# Patient Record
Sex: FEMALE | Race: WHITE | NOT HISPANIC OR LATINO | Employment: FULL TIME | URBAN - METROPOLITAN AREA
[De-identification: names, ages, dates, MRNs, and addresses within clinical notes are randomized per-mention and may not be internally consistent; named-entity substitution may affect disease eponyms.]

---

## 2017-01-26 ENCOUNTER — CONVERSION ENCOUNTER (OUTPATIENT)
Dept: GENERAL RADIOLOGY | Facility: HOSPITAL | Age: 52
End: 2017-01-26

## 2018-03-09 ENCOUNTER — OFFICE VISIT CONVERTED (OUTPATIENT)
Dept: FAMILY MEDICINE CLINIC | Facility: CLINIC | Age: 53
End: 2018-03-09
Attending: NURSE PRACTITIONER

## 2018-03-09 ENCOUNTER — CONVERSION ENCOUNTER (OUTPATIENT)
Dept: FAMILY MEDICINE CLINIC | Facility: CLINIC | Age: 53
End: 2018-03-09

## 2020-01-21 ENCOUNTER — OFFICE VISIT CONVERTED (OUTPATIENT)
Dept: FAMILY MEDICINE CLINIC | Facility: CLINIC | Age: 55
End: 2020-01-21
Attending: NURSE PRACTITIONER

## 2020-01-21 ENCOUNTER — HOSPITAL ENCOUNTER (OUTPATIENT)
Dept: LAB | Facility: HOSPITAL | Age: 55
Discharge: HOME | End: 2020-01-21
Attending: NURSE PRACTITIONER

## 2020-01-21 LAB
25(OH)D3 SERPL-MCNC: 26.3 NG/ML (ref 30–100)
ALBUMIN SERPL-MCNC: 4.3 G/DL (ref 3.5–5)
ALBUMIN/GLOB SERPL: 1.3 {RATIO} (ref 1.4–2.6)
ALP SERPL-CCNC: 64 U/L (ref 53–141)
ALT SERPL-CCNC: 10 U/L (ref 10–40)
ANION GAP SERPL CALC-SCNC: 17 MMOL/L (ref 8–19)
APPEARANCE UR: CLEAR
AST SERPL-CCNC: 11 U/L (ref 15–50)
BILIRUB SERPL-MCNC: 0.41 MG/DL (ref 0.2–1.3)
BILIRUB UR QL: NEGATIVE
BUN SERPL-MCNC: 11 MG/DL (ref 5–25)
BUN/CREAT SERPL: 16 {RATIO} (ref 6–20)
CALCIUM SERPL-MCNC: 9.2 MG/DL (ref 8.7–10.4)
CHLORIDE SERPL-SCNC: 99 MMOL/L (ref 99–111)
CHOLEST SERPL-MCNC: 228 MG/DL (ref 107–200)
CHOLEST/HDLC SERPL: 5.6 {RATIO} (ref 3–6)
COLOR UR: YELLOW
CONV CO2: 26 MMOL/L (ref 22–32)
CONV COLLECTION SOURCE (UA): NORMAL
CONV CREATININE URINE, RANDOM: 37.9 MG/DL (ref 10–300)
CONV MICROALBUM.,U,RANDOM: <12 MG/L (ref 0–20)
CONV TOTAL PROTEIN: 7.6 G/DL (ref 6.3–8.2)
CONV UROBILINOGEN IN URINE BY AUTOMATED TEST STRIP: 0.2 {EHRLICHU}/DL (ref 0.1–1)
CREAT UR-MCNC: 0.69 MG/DL (ref 0.5–0.9)
EST. AVERAGE GLUCOSE BLD GHB EST-MCNC: 111 MG/DL
GFR SERPLBLD BASED ON 1.73 SQ M-ARVRAT: >60 ML/MIN/{1.73_M2}
GLOBULIN UR ELPH-MCNC: 3.3 G/DL (ref 2–3.5)
GLUCOSE SERPL-MCNC: 105 MG/DL (ref 65–99)
GLUCOSE UR QL: NEGATIVE MG/DL
HBA1C MFR BLD: 5.5 % (ref 3.5–5.7)
HDLC SERPL-MCNC: 41 MG/DL (ref 40–60)
HGB UR QL STRIP: NEGATIVE
KETONES UR QL STRIP: NEGATIVE MG/DL
LDLC SERPL CALC-MCNC: 160 MG/DL (ref 70–100)
LEUKOCYTE ESTERASE UR QL STRIP: NEGATIVE
MICROALBUMIN/CREAT UR: 31.7 MG/G{CRE} (ref 0–35)
NITRITE UR QL STRIP: NEGATIVE
OSMOLALITY SERPL CALC.SUM OF ELEC: 286 MOSM/KG (ref 273–304)
PH UR STRIP.AUTO: 6 [PH] (ref 5–8)
POTASSIUM SERPL-SCNC: 4.1 MMOL/L (ref 3.5–5.3)
PROT UR QL: NEGATIVE MG/DL
SODIUM SERPL-SCNC: 138 MMOL/L (ref 135–147)
SP GR UR: 1.01 (ref 1–1.03)
T4 FREE SERPL-MCNC: 1.1 NG/DL (ref 0.9–1.8)
TRIGL SERPL-MCNC: 135 MG/DL (ref 40–150)
TSH SERPL-ACNC: 2.56 M[IU]/L (ref 0.27–4.2)
VLDLC SERPL-MCNC: 27 MG/DL (ref 5–37)

## 2020-02-12 ENCOUNTER — HOSPITAL ENCOUNTER (OUTPATIENT)
Dept: GENERAL RADIOLOGY | Facility: HOSPITAL | Age: 55
Discharge: HOME | End: 2020-02-12
Attending: NURSE PRACTITIONER

## 2020-02-21 ENCOUNTER — OFFICE VISIT CONVERTED (OUTPATIENT)
Dept: FAMILY MEDICINE CLINIC | Facility: CLINIC | Age: 55
End: 2020-02-21
Attending: NURSE PRACTITIONER

## 2020-06-09 ENCOUNTER — CONVERSION ENCOUNTER (OUTPATIENT)
Dept: FAMILY MEDICINE CLINIC | Facility: CLINIC | Age: 55
End: 2020-06-09

## 2020-06-09 ENCOUNTER — OFFICE VISIT CONVERTED (OUTPATIENT)
Dept: FAMILY MEDICINE CLINIC | Facility: CLINIC | Age: 55
End: 2020-06-09
Attending: NURSE PRACTITIONER

## 2020-06-09 ENCOUNTER — HOSPITAL ENCOUNTER (OUTPATIENT)
Dept: LAB | Facility: HOSPITAL | Age: 55
Discharge: HOME | End: 2020-06-09
Attending: NURSE PRACTITIONER

## 2020-06-09 LAB
25(OH)D3 SERPL-MCNC: 35.5 NG/ML (ref 30–100)
ALBUMIN SERPL-MCNC: 4.3 G/DL (ref 3.5–5)
ALBUMIN/GLOB SERPL: 1.3 {RATIO} (ref 1.4–2.6)
ALP SERPL-CCNC: 71 U/L (ref 53–141)
ALT SERPL-CCNC: 11 U/L (ref 10–40)
ANION GAP SERPL CALC-SCNC: 18 MMOL/L (ref 8–19)
APPEARANCE UR: CLEAR
AST SERPL-CCNC: 12 U/L (ref 15–50)
BILIRUB SERPL-MCNC: 0.33 MG/DL (ref 0.2–1.3)
BILIRUB UR QL: NEGATIVE
BUN SERPL-MCNC: 14 MG/DL (ref 5–25)
BUN/CREAT SERPL: 17 {RATIO} (ref 6–20)
CALCIUM SERPL-MCNC: 9.3 MG/DL (ref 8.7–10.4)
CHLORIDE SERPL-SCNC: 100 MMOL/L (ref 99–111)
CHOLEST SERPL-MCNC: 206 MG/DL (ref 107–200)
CHOLEST/HDLC SERPL: 4.8 {RATIO} (ref 3–6)
COLOR UR: YELLOW
CONV BACTERIA: ABNORMAL
CONV CO2: 23 MMOL/L (ref 22–32)
CONV COLLECTION SOURCE (UA): ABNORMAL
CONV CREATININE URINE, RANDOM: 38 MG/DL (ref 10–300)
CONV MICROALBUM.,U,RANDOM: <12 MG/L (ref 0–20)
CONV TOTAL PROTEIN: 7.5 G/DL (ref 6.3–8.2)
CONV UROBILINOGEN IN URINE BY AUTOMATED TEST STRIP: 0.2 {EHRLICHU}/DL (ref 0.1–1)
CREAT UR-MCNC: 0.81 MG/DL (ref 0.5–0.9)
EST. AVERAGE GLUCOSE BLD GHB EST-MCNC: 117 MG/DL
GFR SERPLBLD BASED ON 1.73 SQ M-ARVRAT: >60 ML/MIN/{1.73_M2}
GLOBULIN UR ELPH-MCNC: 3.2 G/DL (ref 2–3.5)
GLUCOSE SERPL-MCNC: 103 MG/DL (ref 65–99)
GLUCOSE UR QL: NEGATIVE MG/DL
HBA1C MFR BLD: 5.7 % (ref 3.5–5.7)
HDLC SERPL-MCNC: 43 MG/DL (ref 40–60)
HGB UR QL STRIP: NEGATIVE
KETONES UR QL STRIP: NEGATIVE MG/DL
LDLC SERPL CALC-MCNC: 135 MG/DL (ref 70–100)
LEUKOCYTE ESTERASE UR QL STRIP: ABNORMAL
MICROALBUMIN/CREAT UR: 31.6 MG/G{CRE} (ref 0–35)
NITRITE UR QL STRIP: NEGATIVE
OSMOLALITY SERPL CALC.SUM OF ELEC: 285 MOSM/KG (ref 273–304)
PH UR STRIP.AUTO: 7.5 [PH] (ref 5–8)
POTASSIUM SERPL-SCNC: 4 MMOL/L (ref 3.5–5.3)
PROT UR QL: NEGATIVE MG/DL
RBC #/AREA URNS HPF: ABNORMAL /[HPF]
SODIUM SERPL-SCNC: 137 MMOL/L (ref 135–147)
SP GR UR: 1.01 (ref 1–1.03)
SQUAMOUS SPT QL MICRO: ABNORMAL /[HPF]
T4 FREE SERPL-MCNC: 1 NG/DL (ref 0.9–1.8)
TRIGL SERPL-MCNC: 138 MG/DL (ref 40–150)
TSH SERPL-ACNC: 2.05 M[IU]/L (ref 0.27–4.2)
VLDLC SERPL-MCNC: 28 MG/DL (ref 5–37)
WBC #/AREA URNS HPF: ABNORMAL /[HPF]

## 2020-07-10 ENCOUNTER — CONVERSION ENCOUNTER (OUTPATIENT)
Dept: FAMILY MEDICINE CLINIC | Facility: CLINIC | Age: 55
End: 2020-07-10

## 2020-07-10 ENCOUNTER — OFFICE VISIT CONVERTED (OUTPATIENT)
Dept: FAMILY MEDICINE CLINIC | Facility: CLINIC | Age: 55
End: 2020-07-10
Attending: NURSE PRACTITIONER

## 2020-09-30 ENCOUNTER — CONVERSION ENCOUNTER (OUTPATIENT)
Dept: FAMILY MEDICINE CLINIC | Facility: CLINIC | Age: 55
End: 2020-09-30

## 2020-09-30 ENCOUNTER — HOSPITAL ENCOUNTER (OUTPATIENT)
Dept: GENERAL RADIOLOGY | Facility: HOSPITAL | Age: 55
Discharge: HOME | End: 2020-09-30
Attending: NURSE PRACTITIONER

## 2020-09-30 ENCOUNTER — OFFICE VISIT CONVERTED (OUTPATIENT)
Dept: FAMILY MEDICINE CLINIC | Facility: CLINIC | Age: 55
End: 2020-09-30
Attending: NURSE PRACTITIONER

## 2020-11-10 ENCOUNTER — HOSPITAL ENCOUNTER (OUTPATIENT)
Dept: LAB | Facility: HOSPITAL | Age: 55
Discharge: HOME | End: 2020-11-10

## 2020-11-10 LAB
25(OH)D3 SERPL-MCNC: 42 NG/ML (ref 30–100)
ALBUMIN SERPL-MCNC: 4.3 G/DL (ref 3.5–5)
ALBUMIN/GLOB SERPL: 1.4 {RATIO} (ref 1.4–2.6)
ALP SERPL-CCNC: 64 U/L (ref 53–141)
ALT SERPL-CCNC: 23 U/L (ref 10–40)
ANION GAP SERPL CALC-SCNC: 16 MMOL/L (ref 8–19)
AST SERPL-CCNC: 16 U/L (ref 15–50)
BASOPHILS # BLD AUTO: 0.05 10*3/UL (ref 0–0.2)
BASOPHILS NFR BLD AUTO: 0.7 % (ref 0–3)
BILIRUB SERPL-MCNC: 0.39 MG/DL (ref 0.2–1.3)
BUN SERPL-MCNC: 13 MG/DL (ref 5–25)
BUN/CREAT SERPL: 18 {RATIO} (ref 6–20)
CALCIUM SERPL-MCNC: 9.7 MG/DL (ref 8.7–10.4)
CHLORIDE SERPL-SCNC: 102 MMOL/L (ref 99–111)
CHOLEST SERPL-MCNC: 215 MG/DL (ref 107–200)
CHOLEST/HDLC SERPL: 5.7 {RATIO} (ref 3–6)
CONV ABS IMM GRAN: 0.02 10*3/UL (ref 0–0.2)
CONV CO2: 24 MMOL/L (ref 22–32)
CONV IMMATURE GRAN: 0.3 % (ref 0–1.8)
CONV TOTAL PROTEIN: 7.4 G/DL (ref 6.3–8.2)
CREAT UR-MCNC: 0.72 MG/DL (ref 0.5–0.9)
DEPRECATED RDW RBC AUTO: 38.5 FL (ref 36.4–46.3)
EOSINOPHIL # BLD AUTO: 0.13 10*3/UL (ref 0–0.7)
EOSINOPHIL # BLD AUTO: 1.9 % (ref 0–7)
ERYTHROCYTE [DISTWIDTH] IN BLOOD BY AUTOMATED COUNT: 12.7 % (ref 11.7–14.4)
EST. AVERAGE GLUCOSE BLD GHB EST-MCNC: 137 MG/DL
GFR SERPLBLD BASED ON 1.73 SQ M-ARVRAT: >60 ML/MIN/{1.73_M2}
GLOBULIN UR ELPH-MCNC: 3.1 G/DL (ref 2–3.5)
GLUCOSE SERPL-MCNC: 121 MG/DL (ref 65–99)
HBA1C MFR BLD: 6.4 % (ref 3.5–5.7)
HCT VFR BLD AUTO: 39.4 % (ref 37–47)
HDLC SERPL-MCNC: 38 MG/DL (ref 40–60)
HGB BLD-MCNC: 13.2 G/DL (ref 12–16)
LDLC SERPL CALC-MCNC: 149 MG/DL (ref 70–100)
LH SERPL-ACNC: 40.2 M[IU]/ML
LYMPHOCYTES # BLD AUTO: 1.84 10*3/UL (ref 1–5)
LYMPHOCYTES NFR BLD AUTO: 27.5 % (ref 20–45)
MCH RBC QN AUTO: 28.4 PG (ref 27–31)
MCHC RBC AUTO-ENTMCNC: 33.5 G/DL (ref 33–37)
MCV RBC AUTO: 84.7 FL (ref 81–99)
MONOCYTES # BLD AUTO: 0.57 10*3/UL (ref 0.2–1.2)
MONOCYTES NFR BLD AUTO: 8.5 % (ref 3–10)
NEUTROPHILS # BLD AUTO: 4.07 10*3/UL (ref 2–8)
NEUTROPHILS NFR BLD AUTO: 61.1 % (ref 30–85)
NRBC CBCN: 0 % (ref 0–0.7)
OSMOLALITY SERPL CALC.SUM OF ELEC: 287 MOSM/KG (ref 273–304)
PLATELET # BLD AUTO: 295 10*3/UL (ref 130–400)
PMV BLD AUTO: 9.8 FL (ref 9.4–12.3)
POTASSIUM SERPL-SCNC: 3.7 MMOL/L (ref 3.5–5.3)
RBC # BLD AUTO: 4.65 10*6/UL (ref 4.2–5.4)
SODIUM SERPL-SCNC: 138 MMOL/L (ref 135–147)
T4 FREE SERPL-MCNC: 0.9 NG/DL (ref 0.9–1.8)
TRIGL SERPL-MCNC: 138 MG/DL (ref 40–150)
TSH SERPL-ACNC: 1.59 M[IU]/L (ref 0.27–4.2)
VLDLC SERPL-MCNC: 28 MG/DL (ref 5–37)
WBC # BLD AUTO: 6.68 10*3/UL (ref 4.8–10.8)

## 2020-11-11 LAB
INSULIN SERPL-ACNC: 11.8 UIU/ML (ref 2.6–24.9)
PROGEST SERPL-MCNC: 0.2 NG/ML

## 2020-11-15 LAB — CONV ESTROGENS, TOTAL, SERUM: 207 PG/ML

## 2020-11-16 LAB
CONV TESTOSTERONE, FREE: 1.7 PG/ML
TESTOST FREE MFR SERPL: 1.1 %
TESTOST SERPL-MCNC: 15 NG/DL

## 2020-11-17 LAB — T3REVERSE SERPL-MCNC: 18.1 NG/DL (ref 9.2–24.1)

## 2021-05-13 NOTE — PROGRESS NOTES
Progress Note      Patient Name: Zahira Ramirez   Patient ID: [id number]   Sex: Female   YOB: 1965    Referring Provider: Radha JEAN BAPTISTE    Visit Date: September 30, 2020    Provider: PEYTON Cruz   Location: [facility]   Location Address: [address] [place] [address], [address] [place] [address]  [id number]   Location Phone: [phone]          Chief Complaint     The patient is here for left shoulder pain.       History Of Present Illness  Zahira Ramirez is a 54 year old /White female who presents for evaluation and treatment of:      patient presents to the clinic today with acute complaints of left shoulder pain. She states this started 2 weeks ago with no recall of injury. She has been taking Tylenol, advil, aleve with no relief any more. She did state that the pain starts in her shoulder and goes to her neck and down her shoulder. She states that it is constant pain. She also reports numbness/tingling in her fingers when her arm is in a certain position.Pain is more suprascapular as well as around her scapula paraspinous muscles.       Past Medical History  Disease Name Date Onset Notes   Anemia --  --    Broken Bones --  --    Diabetes --  --    Endometriosis --  --    Forgetfulness --  --    Hyperlipemia --  --    Hypothyroidism --  --    Stroke --  --          Past Surgical History  Procedure Name Date Notes   Ablation --  --    carpal tunnel --  --    Cesarian Section --  --    Hysterectomy-Abdominal --  --    Laparotomy --  --    Tubal ligation --  --          Medication List  Name Date Started Instructions   Accu-Chek Fastclix Lancet Drum miscellaneous misc 09/07/2020 USE TO TEST ONCE DAILY   Accu-Chek Guide miscellaneous strip 09/07/2020 USE TO TEST ONCE DAILY   [place] Thyroid 60 mg oral tablet 08/21/2020 TAKE 1 TABLET BY MOUTH EVERY DAY   Crestor 5 mg oral tablet 01/22/2020 take 1 tablet (5 mg) by oral route once daily at bedtime for 90 days    ergocalciferol (vitamin D2) 1,250 mcg (50,000 unit) oral capsule 09/07/2020 TAKE 1 CAPSULE BY MOUTH 1 TIME WEEKLY   glucometer with lancets and strips 06/09/2020 E11.9 testing once a day   lisinopril 2.5 mg oral tablet 02/21/2020 take 1 tablet (2.5 mg) by oral route once daily for 30 days   multivitamin oral tablet  take 1 tablet by oral route daily   phentermine 37.5 mg oral tablet 07/13/2020 take 1 tablet (37.5 mg) by oral route once daily before breakfast for 30 days   Vitamin D2 50,000 unit oral capsule 01/22/2020 take 1 capsule (50,000 unit) by oral route once weekly         Allergy List  Allergen Name Date Reaction Notes   NO KNOWN DRUG ALLERGIES --  --  --        Allergies Reconciled  Family Medical History  Disease Name Relative/Age Notes   Cervical Neoplasm, Malignant Mother/   --    Stroke  --    Heart Disease  --    Aneurysm Mother/   --    Uterine Cancer, Family History  --          Social History  Finding Status Start/Stop Quantity Notes   Alcohol Current some day 0/0 --  drinks rarely; wine   Caffeine Current every day 0/0 --  drinks regularly; coffee; 1-2 times per day   Second hand smoke exposure Never 0/0 --  no   Tobacco Former 13/40 --  former smoker; started smoking at age 15; quit smoking at age 42; smoked 20 cigarette(s) per day         Immunizations  NameDate Admin Mfg Trade Name Lot Number Route Inj VIS Given VIS Publication   Vpycgapva09/30/2020 [facility] Fluzone Quadrivalent [id number] IM RD 09/30/2020 08/15/2019   Comments: pt tolerated injection well         Review of Systems  · Constitutional  o Denies  o : fever, fatigue, weight loss, weight gain  · Cardiovascular  o Denies  o : lower extremity edema, claudication, chest pressure, palpitations  · Respiratory  o Denies  o : shortness of breath, wheezing, cough, hemoptysis, dyspnea on exertion  · Gastrointestinal  o Denies  o : nausea, vomiting, diarrhea, constipation, abdominal pain  · Musculoskeletal  o Admits  o : joint pain, shoulder  "pain      Vitals  Date Time BP Position Site L\R Cuff Size HR RR TEMP (F) WT  HT  BMI kg/m2 BSA m2 O2 Sat FR L/min FiO2 HC       09/30/2020 10:02 /78 Sitting    77 - R 16 97.9  5'  3\"   99 %            Physical Examination  · Constitutional  o Appearance  o : well-nourished, well developed, alert, in no acute distress  · Neck  o Inspection/Palpation  o : normal appearance, no masses or tenderness, trachea midline, no enlarged cervical or supraclavicular lymphnodes palpated  · Respiratory  o Respiratory Effort  o : breathing unlabored  o Inspection of Chest  o : normal appearance, no retractions  o Auscultation of Lungs  o : normal breath sounds throughout  · Cardiovascular  o Heart  o :   § Auscultation of Heart  § : regular rate and rhythm without murmur, PMI normal  o Peripheral Vascular System  o :   § Extremities  § : no cyanosis, clubbing or edema; less than 2 second refill noted  · Musculoskeletal  o General  o : Patient does have left shoulder pain. She is able to perform ROM, but is in a lot of pain when doing so. Tenderness to palpation in the left shoulder suprascapular subscapular as well as paraspinous.  · Skin and Subcutaneous Tissue  o General Inspection  o : no rashes or lesions present, no areas of discoloration  · Neurologic  o Mental Status Examination  o : judgement, insight intact, modd and affect appropriate  o Motor Examination  o : strength grossly intact in all four extremities  o Gait and Station  o : normal gait, able to stand without difficulty          Assessment  · Need for influenza vaccination     V04.81/Z23  · Screening for colon cancer     V76.51/Z12.11  · Cervical pain (neck)     723.1/M54.2  · Polyarthralgia     719.49/M25.50  · Shoulder pain     719.41/M25.519      Plan  · Orders  o Immunization Admin Fee (Single) ([facility]) (45496) - V04.81/Z23 - 09/30/2020  o Fluzone Quadrivalent Vaccine, age 6 months + (44914) - V04.81/Z23 - 09/30/2020   Vaccine - Influenza; Dose: 0.5; Site: " Right Deltoid; Route: Intramuscular; Date: 09/30/2020 10:53:00; Exp: 06/30/2021; Lot: [id number]; Mfg: sanofi pasteur; TradeName: Fluzone Quadrivalent; Administered By: Jacky Bellamy MA; Comment: pt tolerated injection well  o ACO-39: Current medications updated and reviewed (1159F, ) - - 09/30/2020  o ACO-14: Influenza immunization administered or previously received [facility] () - - 09/30/2020  o PHYSICAL THERAPY CONSULTATION ([facility]) - 723.1/M54.2, 719.49/M25.50, 719.41/M25.519 - 09/30/2020  o Shoulder (Left) 2 or more views X-Ray [facility] Preferred View ([id number]-TJ) - 719.41/M25.519 - 09/30/2020  · Medications  o cyclobenzaprine 10 mg oral tablet   SIG: take 1 tablet (10 mg) by oral route 2 times per day as needed   DISP: (30) Tablet with 0 refills  Prescribed on 09/30/2020     o diclofenac sodium 75 mg oral tablet,delayed release (DR/EC)   SIG: take 1 tablet (75 mg) by oral route 2 times per day for 30 days   DISP: (60) Tablet with 0 refills  Prescribed on 09/30/2020     o Medications have been Reconciled  o Transition of Care or Provider Policy  · Instructions  o Patient declines colorectal cancer screening. Discussed risks associated with not having screening performed.   o Patient was educated/instructed on their diagnosis, treatment and medications prior to discharge from the clinic today.  o Minutes spent with patient including greater than 50% in Education/Counseling/Care Coordination.  o Time spent with the patient was minutes, more than 50% face to face.  · Disposition  o Call or Return if symptoms worsen or persist.            Electronically Signed by: PEYTON Cruz - on September 30, 2020 11:25:10 AM

## 2021-05-13 NOTE — PROGRESS NOTES
Progress Note      Patient Name: Zahira Ramirez   Patient ID: [id number]   Sex: Female   YOB: 1965    Referring Provider: Radha JEAN BAPTISTE    Visit Date: July 10, 2020    Provider: PEYTON Cruz   Location: [facility]   Location Address: [address] [place] [address], [address]  [id number]   Location Phone: [phone]          Chief Complaint     The patient is here for a one month f/u of weight.       History Of Present Illness  Zahira Ramirez is a 54 year old /White female who presents for evaluation and treatment of:      Obesity:  doing well lost 9 pounds in 1 month.  And denies chest pain and SOB.  Doing well and is feeling good on medication.       Past Medical History  Disease Name Date Onset Notes   Anemia --  --    Broken Bones --  --    Diabetes --  --    Endometriosis --  --    Forgetfulness --  --    Hyperlipemia --  --    Hypothyroidism --  --    Stroke --  --          Past Surgical History  Procedure Name Date Notes   Ablation --  --    carpal tunnel --  --    Cesarian Section --  --    Hysterectomy-Abdominal --  --    Laparotomy --  --    Tubal ligation --  --          Medication List  Name Date Started Instructions   [place] Thyroid 60 mg oral tablet 02/21/2020 TAKE 1 TABLET BY MOUTH EVERY DAY   Crestor 5 mg oral tablet 01/22/2020 take 1 tablet (5 mg) by oral route once daily at bedtime for 90 days   glucometer with lancets and strips 06/09/2020 E11.9 testing once a day   lisinopril 2.5 mg oral tablet 02/21/2020 take 1 tablet (2.5 mg) by oral route once daily for 30 days   multivitamin oral tablet  take 1 tablet by oral route daily   phentermine 37.5 mg oral tablet 06/09/2020 take 1 tablet (37.5 mg) by oral route once daily before breakfast for 30 days   Vitamin D2 50,000 unit oral capsule 01/22/2020 take 1 capsule (50,000 unit) by oral route once weekly         Allergy List  Allergen Name Date Reaction Notes   NO KNOWN DRUG ALLERGIES --  --   "--          Family Medical History  Disease Name Relative/Age Notes   Cervical Neoplasm, Malignant Mother/   --    Stroke  --    Heart Disease  --    Aneurysm Mother/   --    Uterine Cancer, Family History  --          Social History  Finding Status Start/Stop Quantity Notes   Alcohol Current some day 0/0 --  drinks rarely; wine   Caffeine Current every day 0/0 --  drinks regularly; coffee; 1-2 times per day   Second hand smoke exposure Never 0/0 --  no   Tobacco Former 13/40 --  former smoker; started smoking at age 15; quit smoking at age 42; smoked 20 cigarette(s) per day         Immunizations  NameDate Admin Mfg Trade Name Lot Number Route Inj VIS Given VIS Publication   Vpwqutkyh53/21/2020 [facility] Fluzone Quadrivalent [id number] [facility] 01/21/2020    Comments: pt tolerated injection well   Wrmvmcdiy97/17/2017 [facility] Fluarix, quadrivalent, preservative free [id number] [facility] 10/17/2017 08/07/2015   Comments: pt left office in stable condition   Zufzbjdqc15/12/2016 [facility] Fluarix, quadrivalent, preservative free [id number] [facility] 10/12/2016 07/02/2012   Comments: Pt tolerated inj well         Review of Systems  · Constitutional  o Admits  o : weight loss  o Denies  o : fever, fatigue, weight gain  · Cardiovascular  o Denies  o : lower extremity edema, claudication, chest pressure, palpitations  · Respiratory  o Denies  o : shortness of breath, wheezing, cough, hemoptysis, dyspnea on exertion  · Gastrointestinal  o Denies  o : nausea, vomiting, diarrhea, constipation, abdominal pain      Vitals  Date Time BP Position Site L\R Cuff Size HR RR TEMP (F) WT  HT  BMI kg/m2 BSA m2 O2 Sat        07/10/2020 09:03 /84 Sitting    89 - R 16 98 218lbs 0oz 5'  3\" 38.62 2.1 100 %          Physical Examination  · Constitutional  o Appearance  o : well-nourished, well developed, alert, in no acute distress  · Eyes  o Conjunctivae  o : conjunctivae normal  o Sclerae  o : sclerae white  o Pupils and Irises  o : pupils equal, round, and reactive to " light and accommodation bilaterally  o Corneas  o : tear film normal, no lesions present  o Eyelids/Ocular Adnexae  o : eyelid appearance normal, no exudates present, eye moisture level normal  · Respiratory  o Respiratory Effort  o : breathing unlabored  o Inspection of Chest  o : normal appearance, no retractions  o Auscultation of Lungs  o : normal breath sounds throughout  · Cardiovascular  o Heart  o :   § Auscultation of Heart  § : regular rate and rhythm without murmur, PMI normal  o Peripheral Vascular System  o :   § Extremities  § : no cyanosis, clubbing or edema; less than 2 second refill noted  · Musculoskeletal  o General  o : No joint swelling or deformity noted. Muscle tone, strength and development grossly normal.  · Skin and Subcutaneous Tissue  o General Inspection  o : no rashes or lesions present, no areas of discoloration  · Neurologic  o Mental Status Examination  o : judgement, insight intact, modd and affect appropriate  o Motor Examination  o : strength grossly intact in all four extremities  o Gait and Station  o : normal gait, able to stand without difficulty          Assessment  · Obesity     278.00/E66.9  · BMI 38.0-38.9,adult     V85.38/Z68.38      Plan  · Orders  o ACO-39: Current medications updated and reviewed () - - 07/10/2020  o ACO-14: Influenza immunization administered or previously received () - - 07/10/2020  · Medications  o phentermine 37.5 mg oral tablet   SIG: take 1 tablet (37.5 mg) by oral route once daily before breakfast for 30 days   DISP: (30) tablets with 0 refills  Refilled on 07/10/2020     o Medications have been Reconciled  o Transition of Care or Provider Policy  · Instructions  o Patient was educated/instructed on their diagnosis, treatment and medications prior to discharge from the clinic today.  o Minutes spent with patient including greater than 50% in Education/Counseling/Care Coordination.  o Time spent with the patient was minutes, more than  50% face to face.  · Disposition  o F/U in 1 month            Electronically Signed by: PEYTON Cruz -Author on July 10, 2020 09:20:38 AM

## 2021-05-13 NOTE — PROGRESS NOTES
Progress Note      Patient Name: Zahira Ramirez   Patient ID: [id number]   Sex: Female   YOB: 1965    Referring Provider: Radha JEAN BAPTISTE    Visit Date: June 9, 2020    Provider: PEYTON Cruz   [facility]   Location Address: [address] [place] [address], [address]  [id number]   Location Phone: [phone]          Chief Complaint     The patient is here for a f/u of weight.       History Of Present Illness  Zahira Ramirez is a 54 year old /White female who presents for evaluation and treatment of:      follow up on weight.  Hasn't had med for 3 months.  Didn't have isssues with when taking.  Has gained 10 pounds in the last months during quarantined.    Hypo-thyroidism.  She needs to get her labs checked.    Diabetes: Doing well with her diet controlled.  However she does note that her wounds are taking longer to heal.  So she knows she does need to lose weight.  Also states that she is a glucometer so she can check her sugars.  She has not been taking her Crestor due to the fact that it causes her hair to fall out.       Past Medical History  Disease Name Date Onset Notes   Anemia --  --    Broken Bones --  --    Diabetes --  --    Endometriosis --  --    Forgetfulness --  --    Hyperlipemia --  --    Hypothyroidism --  --    Stroke --  --          Past Surgical History  Procedure Name Date Notes   Ablation --  --    carpal tunnel --  --    Cesarian Section --  --    Hysterectomy-Abdominal --  --    Laparotomy --  --    Tubal ligation --  --          Medication List  Name Date Started Instructions   [place] Thyroid 60 mg oral tablet 02/21/2020 TAKE 1 TABLET BY MOUTH EVERY DAY   Crestor 5 mg oral tablet 01/22/2020 take 1 tablet (5 mg) by oral route once daily at bedtime for 90 days   lisinopril 2.5 mg oral tablet 02/21/2020 take 1 tablet (2.5 mg) by oral route once daily for 30 days   multivitamin oral tablet  take 1 tablet by oral route daily    phentermine 37.5 mg oral tablet 02/21/2020 take 1 tablet (37.5 mg) by oral route once daily before breakfast for 30 days   Vitamin D2 50,000 unit oral capsule 01/22/2020 take 1 capsule (50,000 unit) by oral route once weekly         Allergy List  Allergen Name Date Reaction Notes   NO KNOWN DRUG ALLERGIES --  --  --        Allergies Reconciled  Family Medical History  Disease Name Relative/Age Notes   Cervical Neoplasm, Malignant Mother/   --    Stroke  --    Heart Disease  --    Aneurysm Mother/   --    Uterine Cancer, Family History  --          Social History  Finding Status Start/Stop Quantity Notes   Alcohol Current some day 0/0 --  drinks rarely; wine   Caffeine Current every day 0/0 --  drinks regularly; coffee; 1-2 times per day   Second hand smoke exposure Never 0/0 --  no   Tobacco Former 15/40 --  former smoker; started smoking at age 15; quit smoking at age 42; smoked 20 cigarette(s) per day         Immunizations  NameDate Admin Mfg Trade Name Lot Number Route Inj VIS Given VIS Publication   Yqcybrxpu82/21/2020 [facility] Fluzone Quadrivalent [id number] [facility] 01/21/2020    Comments: pt tolerated injection well   Ksidgxezb56/17/2017 [facility] Fluarix, quadrivalent, preservative free [id number] [facility] 10/17/2017 08/07/2015   Comments: pt left office in stable condition   Oxnldceld99/12/2016 [facility] Fluarix, quadrivalent, preservative free [id number] [facility] 10/12/2016 07/02/2012   Comments: Pt tolerated inj well         Review of Systems  · Constitutional  o Admits  o : weight gain  o Denies  o : fever, fatigue, weight loss  · Cardiovascular  o Denies  o : lower extremity edema, claudication, chest pressure, palpitations  · Respiratory  o Denies  o : shortness of breath, wheezing, cough, hemoptysis, dyspnea on exertion  · Gastrointestinal  o Denies  o : nausea, vomiting, diarrhea, constipation, abdominal pain      Vitals  Date Time BP Position Site L\R Cuff Size HR RR TEMP (F) WT  HT  BMI kg/m2 BSA m2 O2 Sat HC       06/09/2020 10:07  "/79 Sitting    76 - R 16 98.6 227lbs 0oz 5'  3\" 40.21 2.14 99 %          Physical Examination  · Constitutional  o Appearance  o : well-nourished, well developed, alert, in no acute distress  · Eyes  o Conjunctivae  o : conjunctivae normal  o Sclerae  o : sclerae white  o Pupils and Irises  o : pupils equal, round, and reactive to light and accommodation bilaterally  o Corneas  o : tear film normal, no lesions present  o Eyelids/Ocular Adnexae  o : eyelid appearance normal, no exudates present, eye moisture level normal  · Respiratory  o Respiratory Effort  o : breathing unlabored  o Inspection of Chest  o : normal appearance, no retractions  o Auscultation of Lungs  o : normal breath sounds throughout  · Cardiovascular  o Heart  o :   § Auscultation of Heart  § : regular rate and rhythm without murmur, PMI normal  o Peripheral Vascular System  o :   § Extremities  § : no cyanosis, clubbing or edema; less than 2 second refill noted  · Musculoskeletal  o General  o : No joint swelling or deformity noted. Muscle tone, strength and development grossly normal.  · Skin and Subcutaneous Tissue  o General Inspection  o : no rashes or lesions present, no areas of discoloration  · Neurologic  o Mental Status Examination  o : judgement, insight intact, modd and affect appropriate  o Motor Examination  o : strength grossly intact in all four extremities  o Gait and Station  o : normal gait, able to stand without difficulty          Assessment  · Diabetes mellitus, type 2     250.00/E11.9  · Hypothyroidism     244.9/E03.9  · Obesity     278.00/E66.9  · Vitamin D deficiency     268.9/E55.9  · BMI 40.0-44.9, adult     V85.41/Z68.41      Plan  · Orders  o Diabetes 2 Panel (Urine Microalbumin, CMP, Lipid, A1c, ) [facility] (93595, 96754, 82141, 27450) - 250.00/E11.9 - 06/09/2020  o Urinalysis with Reflex Microscopy if abnormal ([facility]) (04610) - 250.00/E11.9 - 06/09/2020  o Thyroid Profile (82352, 03420, THYII) - 244.9/E03.9 - " 06/09/2020  o Vitamin D Level (77598) - 268.9/E55.9 - 06/09/2020  o [place] Report ([facility]) - - 06/09/2020  o ACO-39: Current medications updated and reviewed () - - 06/09/2020  · Medications  o glucometer with lancets and strips   SIG: E11.9 testing once a day   DISP: (1) with 0 refills  Prescribed on 06/09/2020     o phentermine 37.5 mg oral tablet   SIG: take 1 tablet (37.5 mg) by oral route once daily before breakfast for 30 days   DISP: (30) tablets with 0 refills  Refilled on 06/09/2020     o Medications have been Reconciled  o Transition of Care or Provider Policy  · Instructions  o Patient was educated/instructed on their diagnosis, treatment and medications prior to discharge from the clinic today.  o Minutes spent with patient including greater than 50% in Education/Counseling/Care Coordination.  o Time spent with the patient was minutes, more than 50% face to face.  · Disposition  o F/U in 1 month            Electronically Signed by: PEYTON Cruz -Author on June 9, 2020 11:04:01 AM

## 2021-05-14 VITALS
HEIGHT: 63 IN | OXYGEN SATURATION: 99 % | RESPIRATION RATE: 16 BRPM | DIASTOLIC BLOOD PRESSURE: 78 MMHG | SYSTOLIC BLOOD PRESSURE: 132 MMHG | HEART RATE: 77 BPM | TEMPERATURE: 97.9 F

## 2021-05-15 VITALS
HEIGHT: 63 IN | TEMPERATURE: 98.6 F | WEIGHT: 227 LBS | DIASTOLIC BLOOD PRESSURE: 79 MMHG | SYSTOLIC BLOOD PRESSURE: 130 MMHG | RESPIRATION RATE: 16 BRPM | OXYGEN SATURATION: 99 % | BODY MASS INDEX: 40.22 KG/M2 | HEART RATE: 76 BPM

## 2021-05-15 VITALS
DIASTOLIC BLOOD PRESSURE: 80 MMHG | BODY MASS INDEX: 38.62 KG/M2 | OXYGEN SATURATION: 98 % | HEART RATE: 85 BPM | HEIGHT: 63 IN | TEMPERATURE: 97.8 F | SYSTOLIC BLOOD PRESSURE: 138 MMHG | WEIGHT: 218 LBS | RESPIRATION RATE: 18 BRPM

## 2021-05-15 VITALS
BODY MASS INDEX: 38.62 KG/M2 | OXYGEN SATURATION: 100 % | TEMPERATURE: 98 F | WEIGHT: 218 LBS | RESPIRATION RATE: 16 BRPM | SYSTOLIC BLOOD PRESSURE: 122 MMHG | HEIGHT: 63 IN | HEART RATE: 89 BPM | DIASTOLIC BLOOD PRESSURE: 84 MMHG

## 2021-05-15 VITALS
HEART RATE: 77 BPM | DIASTOLIC BLOOD PRESSURE: 80 MMHG | HEIGHT: 63 IN | SYSTOLIC BLOOD PRESSURE: 110 MMHG | TEMPERATURE: 97.6 F | BODY MASS INDEX: 40.4 KG/M2 | RESPIRATION RATE: 16 BRPM | OXYGEN SATURATION: 94 % | WEIGHT: 228 LBS

## 2021-05-16 VITALS
HEIGHT: 63 IN | TEMPERATURE: 97.4 F | OXYGEN SATURATION: 99 % | SYSTOLIC BLOOD PRESSURE: 113 MMHG | HEART RATE: 81 BPM | RESPIRATION RATE: 25 BRPM | DIASTOLIC BLOOD PRESSURE: 60 MMHG | WEIGHT: 205 LBS | BODY MASS INDEX: 36.32 KG/M2

## 2021-06-23 ENCOUNTER — TELEPHONE (OUTPATIENT)
Dept: FAMILY MEDICINE CLINIC | Facility: CLINIC | Age: 56
End: 2021-06-23

## 2021-06-23 NOTE — TELEPHONE ENCOUNTER
Caller: Zahira Ramirez    Relationship to patient: Self    Best call back number: [phone]    Chief complaint: BRONCHITIS FOLLOW-UP    Type of visit: OFFICE    Requested date: 06/24/2021     If rescheduling, when is the original appointment: 07/22/2021    Additional notes:THE PATIENT WAS RELEASED FROM URGENT CARE ON 06/22/2021 AND WAS DIAGNOSED WITH BRONCHITIS. SHE WAS TOLD TO FOLLOW-UP WITH PCP ASAP.

## 2021-06-24 NOTE — TELEPHONE ENCOUNTER
Spoke with pt, her fever has broken and she is feeling better, is going back to work tomorrow and has a f/u in July, she will call if she is gets worse.

## 2021-10-19 RX ORDER — ERGOCALCIFEROL 1.25 MG/1
CAPSULE ORAL
Qty: 13 CAPSULE | Refills: 1 | Status: SHIPPED | OUTPATIENT
Start: 2021-10-19 | End: 2022-03-30

## 2021-10-20 RX ORDER — BLOOD SUGAR DIAGNOSTIC
STRIP MISCELLANEOUS
Qty: 100 EACH | Refills: 1 | Status: SHIPPED | OUTPATIENT
Start: 2021-10-20

## 2021-11-30 ENCOUNTER — PATIENT MESSAGE (OUTPATIENT)
Dept: FAMILY MEDICINE CLINIC | Facility: CLINIC | Age: 56
End: 2021-11-30

## 2021-11-30 DIAGNOSIS — U07.1 COVID: Primary | ICD-10-CM

## 2021-12-01 RX ORDER — METHYLPREDNISOLONE 4 MG/1
TABLET ORAL
Qty: 21 TABLET | Refills: 0 | Status: SHIPPED | OUTPATIENT
Start: 2021-12-01 | End: 2022-02-18

## 2021-12-04 ENCOUNTER — APPOINTMENT (OUTPATIENT)
Dept: GENERAL RADIOLOGY | Facility: HOSPITAL | Age: 56
End: 2021-12-04

## 2021-12-04 ENCOUNTER — HOSPITAL ENCOUNTER (EMERGENCY)
Facility: HOSPITAL | Age: 56
Discharge: HOME | End: 2021-12-04
Attending: EMERGENCY MEDICINE | Admitting: EMERGENCY MEDICINE

## 2021-12-04 VITALS
DIASTOLIC BLOOD PRESSURE: 78 MMHG | TEMPERATURE: 98.4 F | SYSTOLIC BLOOD PRESSURE: 138 MMHG | RESPIRATION RATE: 18 BRPM | WEIGHT: 213.63 LBS | HEIGHT: 64 IN | BODY MASS INDEX: 36.47 KG/M2 | HEART RATE: 77 BPM | OXYGEN SATURATION: 97 %

## 2021-12-04 DIAGNOSIS — U07.1 COVID-19: ICD-10-CM

## 2021-12-04 DIAGNOSIS — J98.01 BRONCHOSPASM: Primary | ICD-10-CM

## 2021-12-04 LAB
ALBUMIN SERPL-MCNC: 4.2 G/DL (ref 3.5–5.2)
ALBUMIN/GLOB SERPL: 1.4 G/DL
ALP SERPL-CCNC: 65 U/L (ref 39–117)
ALT SERPL W P-5'-P-CCNC: 39 U/L (ref 1–33)
ANION GAP SERPL CALCULATED.3IONS-SCNC: 8.9 MMOL/L (ref 5–15)
AST SERPL-CCNC: 33 U/L (ref 1–32)
BASOPHILS # BLD AUTO: 0.01 10*3/MM3 (ref 0–0.2)
BASOPHILS NFR BLD AUTO: 0.3 % (ref 0–1.5)
BILIRUB SERPL-MCNC: 0.3 MG/DL (ref 0–1.2)
BUN SERPL-MCNC: 9 MG/DL (ref 6–20)
BUN/CREAT SERPL: 12.7 (ref 7–25)
CALCIUM SPEC-SCNC: 9.1 MG/DL (ref 8.6–10.5)
CHLORIDE SERPL-SCNC: 105 MMOL/L (ref 98–107)
CO2 SERPL-SCNC: 26.1 MMOL/L (ref 22–29)
CREAT SERPL-MCNC: 0.71 MG/DL (ref 0.57–1)
D DIMER PPP FEU-MCNC: 0.54 MG/L (FEU) (ref 0–0.59)
DEPRECATED RDW RBC AUTO: 41.5 FL (ref 37–54)
EOSINOPHIL # BLD AUTO: 0.04 10*3/MM3 (ref 0–0.4)
EOSINOPHIL NFR BLD AUTO: 1 % (ref 0.3–6.2)
ERYTHROCYTE [DISTWIDTH] IN BLOOD BY AUTOMATED COUNT: 13.2 % (ref 12.3–15.4)
GFR SERPL CREATININE-BSD FRML MDRD: 86 ML/MIN/1.73
GLOBULIN UR ELPH-MCNC: 3 GM/DL
GLUCOSE SERPL-MCNC: 105 MG/DL (ref 65–99)
HCT VFR BLD AUTO: 38.8 % (ref 34–46.6)
HGB BLD-MCNC: 12.8 G/DL (ref 12–15.9)
HOLD SPECIMEN: NORMAL
HOLD SPECIMEN: NORMAL
IMM GRANULOCYTES # BLD AUTO: 0.02 10*3/MM3 (ref 0–0.05)
IMM GRANULOCYTES NFR BLD AUTO: 0.5 % (ref 0–0.5)
LYMPHOCYTES # BLD AUTO: 0.95 10*3/MM3 (ref 0.7–3.1)
LYMPHOCYTES NFR BLD AUTO: 24.1 % (ref 19.6–45.3)
MCH RBC QN AUTO: 28.3 PG (ref 26.6–33)
MCHC RBC AUTO-ENTMCNC: 33 G/DL (ref 31.5–35.7)
MCV RBC AUTO: 85.8 FL (ref 79–97)
MONOCYTES # BLD AUTO: 0.29 10*3/MM3 (ref 0.1–0.9)
MONOCYTES NFR BLD AUTO: 7.4 % (ref 5–12)
NEUTROPHILS NFR BLD AUTO: 2.63 10*3/MM3 (ref 1.7–7)
NEUTROPHILS NFR BLD AUTO: 66.7 % (ref 42.7–76)
NRBC BLD AUTO-RTO: 0 /100 WBC (ref 0–0.2)
NT-PROBNP SERPL-MCNC: 26.6 PG/ML (ref 0–900)
PLATELET # BLD AUTO: 194 10*3/MM3 (ref 140–450)
PMV BLD AUTO: 9.2 FL (ref 6–12)
POTASSIUM SERPL-SCNC: 4.3 MMOL/L (ref 3.5–5.2)
PROT SERPL-MCNC: 7.2 G/DL (ref 6–8.5)
QT INTERVAL: 398 MS
RBC # BLD AUTO: 4.52 10*6/MM3 (ref 3.77–5.28)
SODIUM SERPL-SCNC: 140 MMOL/L (ref 136–145)
TROPONIN T SERPL-MCNC: <0.01 NG/ML (ref 0–0.03)
WBC NRBC COR # BLD: 3.94 10*3/MM3 (ref 3.4–10.8)
WHOLE BLOOD HOLD SPECIMEN: NORMAL
WHOLE BLOOD HOLD SPECIMEN: NORMAL

## 2021-12-04 PROCEDURE — 93005 ELECTROCARDIOGRAM TRACING: CPT | Performed by: EMERGENCY MEDICINE

## 2021-12-04 PROCEDURE — 83880 ASSAY OF NATRIURETIC PEPTIDE: CPT | Performed by: EMERGENCY MEDICINE

## 2021-12-04 PROCEDURE — 94640 AIRWAY INHALATION TREATMENT: CPT

## 2021-12-04 PROCEDURE — 80053 COMPREHEN METABOLIC PANEL: CPT | Performed by: EMERGENCY MEDICINE

## 2021-12-04 PROCEDURE — 36415 COLL VENOUS BLD VENIPUNCTURE: CPT | Performed by: EMERGENCY MEDICINE

## 2021-12-04 PROCEDURE — 85025 COMPLETE CBC W/AUTO DIFF WBC: CPT | Performed by: EMERGENCY MEDICINE

## 2021-12-04 PROCEDURE — 94799 UNLISTED PULMONARY SVC/PX: CPT

## 2021-12-04 PROCEDURE — 84484 ASSAY OF TROPONIN QUANT: CPT | Performed by: EMERGENCY MEDICINE

## 2021-12-04 PROCEDURE — 96372 THER/PROPH/DIAG INJ SC/IM: CPT

## 2021-12-04 PROCEDURE — 99283 EMERGENCY DEPT VISIT LOW MDM: CPT

## 2021-12-04 PROCEDURE — 71045 X-RAY EXAM CHEST 1 VIEW: CPT

## 2021-12-04 PROCEDURE — 85379 FIBRIN DEGRADATION QUANT: CPT | Performed by: PHYSICIAN ASSISTANT

## 2021-12-04 PROCEDURE — 25010000002 DEXAMETHASONE PER 1 MG: Performed by: PHYSICIAN ASSISTANT

## 2021-12-04 PROCEDURE — 93010 ELECTROCARDIOGRAM REPORT: CPT | Performed by: INTERNAL MEDICINE

## 2021-12-04 RX ORDER — IPRATROPIUM BROMIDE AND ALBUTEROL SULFATE 2.5; .5 MG/3ML; MG/3ML
3 SOLUTION RESPIRATORY (INHALATION) ONCE
Status: COMPLETED | OUTPATIENT
Start: 2021-12-04 | End: 2021-12-04

## 2021-12-04 RX ORDER — ALBUTEROL SULFATE 1.25 MG/3ML
1 SOLUTION RESPIRATORY (INHALATION) EVERY 6 HOURS PRN
Qty: 1 EACH | Refills: 0 | Status: SHIPPED | OUTPATIENT
Start: 2021-12-04 | End: 2022-03-23

## 2021-12-04 RX ORDER — DEXAMETHASONE SODIUM PHOSPHATE 10 MG/ML
10 INJECTION INTRAMUSCULAR; INTRAVENOUS ONCE
Status: COMPLETED | OUTPATIENT
Start: 2021-12-04 | End: 2021-12-04

## 2021-12-04 RX ORDER — SODIUM CHLORIDE 0.9 % (FLUSH) 0.9 %
10 SYRINGE (ML) INJECTION AS NEEDED
Status: DISCONTINUED | OUTPATIENT
Start: 2021-12-04 | End: 2021-12-04 | Stop reason: HOSPADM

## 2021-12-04 RX ADMIN — DEXAMETHASONE SODIUM PHOSPHATE 10 MG: 10 INJECTION INTRAMUSCULAR; INTRAVENOUS at 10:10

## 2021-12-04 RX ADMIN — IPRATROPIUM BROMIDE AND ALBUTEROL SULFATE 3 ML: .5; 2.5 SOLUTION RESPIRATORY (INHALATION) at 09:13

## 2021-12-04 NOTE — ED PROVIDER NOTES
Subjective   Pt states she is + for COVID. Had infusion 2 days ago. Woke up this AM and had increased SOA/heaviness in chest and came for eval. Not vaccinated.       History provided by:  Patient  Shortness of Breath  Severity:  Moderate  Onset quality:  Gradual  Duration:  1 week  Timing:  Intermittent  Progression:  Worsening  Chronicity:  New  Relieved by: relieved slightly with albuterol MDI.  Worsened by:  Nothing  Associated symptoms: no fever        Review of Systems   Constitutional: Negative for appetite change, chills, fatigue and fever.   HENT: Negative.    Eyes: Negative.  Negative for photophobia.   Respiratory: Positive for chest tightness and shortness of breath.    Gastrointestinal: Negative.    Endocrine: Negative.    Genitourinary: Negative.    Musculoskeletal: Negative.    Skin: Negative.    Allergic/Immunologic: Negative.  Negative for immunocompromised state.   Neurological: Negative.    Hematological: Negative.    Psychiatric/Behavioral: Negative.    All other systems reviewed and are negative.      Past Medical History:   Diagnosis Date   • Anemia    • Broken bones    • Endometriosis    • Forgetfulness    • Hyperlipemia    • Hypothyroidism    • Stroke (HCC)        No Known Allergies    Past Surgical History:   Procedure Laterality Date   • ABDOMINAL HYSTERECTOMY     • CARPAL TUNNEL RELEASE     •  SECTION     • LASER ABLATION     • OTHER SURGICAL HISTORY      Laparotomy   • TUBAL ABDOMINAL LIGATION         Family History   Problem Relation Age of Onset   • Cervical cancer Mother         Malignant   • Aneurysm Mother    • Stroke Other    • Heart disease Other    • Uterine cancer Other        Social History     Socioeconomic History   • Marital status:    Tobacco Use   • Smoking status: Former Smoker     Packs/day: 1.00     Types: Cigarettes   • Tobacco comment: quit at age 42. second hand smoke exposure-never   Substance and Sexual Activity   • Alcohol use: Yes     Comment:  drinks rarely; wine           Objective   Physical Exam  Vitals and nursing note reviewed.   Constitutional:       General: She is not in acute distress.     Appearance: Normal appearance. She is normal weight. She is not ill-appearing or toxic-appearing.   HENT:      Head: Normocephalic and atraumatic.      Right Ear: Tympanic membrane, ear canal and external ear normal.      Left Ear: Tympanic membrane, ear canal and external ear normal.      Nose: Nose normal.      Mouth/Throat:      Mouth: Mucous membranes are moist.      Pharynx: Oropharynx is clear.   Eyes:      Conjunctiva/sclera: Conjunctivae normal.      Pupils: Pupils are equal, round, and reactive to light.   Cardiovascular:      Rate and Rhythm: Normal rate and regular rhythm.      Heart sounds: Normal heart sounds.   Pulmonary:      Effort: Pulmonary effort is normal.      Breath sounds: Examination of the right-lower field reveals wheezing. Examination of the left-lower field reveals wheezing. Wheezing present.   Abdominal:      General: Abdomen is flat.      Palpations: Abdomen is soft.   Musculoskeletal:         General: Normal range of motion.      Cervical back: Normal range of motion and neck supple.   Skin:     General: Skin is warm and dry.   Neurological:      Mental Status: She is alert and oriented to person, place, and time. Mental status is at baseline.   Psychiatric:         Mood and Affect: Mood normal.         Behavior: Behavior normal.         Thought Content: Thought content normal.         Judgment: Judgment normal.           ED Course  ED Course as of 12/04/21 1006   Sat Dec 04, 2021   1003 Symptoms better after neb. [BE]      ED Course User Index  [BE] John Figueroa PA          MDM  Number of Diagnoses or Management Options  Bronchospasm  COVID-19  Diagnosis management comments: Pt is a 56 y.o. female that presents today with Patient presents with:  Shortness of Breath       Work up today:  Lab Results (last 24 hours)     Procedure  Component Value Units Date/Time    CBC & Differential ([id number])  (Normal) Collected: 12/04/21 0841    Specimen: Blood from Arm, Right Updated: 12/04/21 0848    Narrative:      The following orders were created for panel order CBC & Differential.  Procedure                               Abnormality         Status                       ---------                               -----------         ------                       CBC Auto Differential([id number])        Normal              Final result                   Please view results for these tests on the individual orders.    Comprehensive Metabolic Panel ([id number])  (Abnormal) Collected: 12/04/21 0841    Specimen: Blood from Arm, Right Updated: 12/04/21 0911     Glucose 105 mg/dL      BUN 9 mg/dL      Creatinine 0.71 mg/dL      Sodium 140 mmol/L      Potassium 4.3 mmol/L      Chloride 105 mmol/L      CO2 26.1 mmol/L      Calcium 9.1 mg/dL      Total Protein 7.2 g/dL      Albumin 4.20 g/dL      ALT (SGPT) 39 U/L      AST (SGOT) 33 U/L      Alkaline Phosphatase 65 U/L      Total Bilirubin 0.3 mg/dL      eGFR Non African Amer 86 mL/min/1.73      Globulin 3.0 gm/dL      A/G Ratio 1.4 g/dL      BUN/Creatinine Ratio 12.7     Anion Gap 8.9 mmol/L     Narrative:      GFR Normal >60  Chronic Kidney Disease <60  Kidney Failure <15      BNP ([id number])  (Normal) Collected: 12/04/21 0841    Specimen: Blood from Arm, Right Updated: 12/04/21 0909     proBNP 26.6 pg/mL     Narrative:      Among patients with dyspnea, NT-proBNP is highly sensitive for the   detection of acute congestive heart failure. In addition NT-proBNP of <300   pg/ml effectively rules out acute congestive heart failure with 99%   negative predictive value.    Results may be falsely decreased if patient taking Biotin.      Troponin ([id number])  (Normal) Collected: 12/04/21 0841    Specimen: Blood from Arm, Right Updated: 12/04/21 0911     Troponin T <0.010 ng/mL     Narrative:      Troponin T Reference  Range:  <= 0.03 ng/mL-   Negative for AMI  >0.03 ng/mL-     Abnormal for myocardial necrosis.  Clinicians would have   to utilize clinical acumen, EKG, Troponin and serial changes to determine   if it is an Acute Myocardial Infarction or myocardial injury due to an   underlying chronic condition.       Results may be falsely decreased if patient taking Biotin.      CBC Auto Differential ([id number])  (Normal) Collected: 12/04/21 0841    Specimen: Blood from Arm, Right Updated: 12/04/21 0848     WBC 3.94 10*3/mm3      RBC 4.52 10*6/mm3      Hemoglobin 12.8 g/dL      Hematocrit 38.8 %      MCV 85.8 fL      MCH 28.3 pg      MCHC 33.0 g/dL      RDW 13.2 %      RDW-SD 41.5 fl      MPV 9.2 fL      Platelets 194 10*3/mm3      Neutrophil % 66.7 %      Lymphocyte % 24.1 %      Monocyte % 7.4 %      Eosinophil % 1.0 %      Basophil % 0.3 %      Immature Grans % 0.5 %      Neutrophils, Absolute 2.63 10*3/mm3      Lymphocytes, Absolute 0.95 10*3/mm3      Monocytes, Absolute 0.29 10*3/mm3      Eosinophils, Absolute 0.04 10*3/mm3      Basophils, Absolute 0.01 10*3/mm3      Immature Grans, Absolute 0.02 10*3/mm3      nRBC 0.0 /100 WBC     D-dimer, Quantitative ([id number])  (Normal) Collected: 12/04/21 0841    Specimen: Blood from Arm, Right Updated: 12/04/21 1000     D-Dimer, Quantitative 0.54 mg/L (FEU)     Narrative:      Effective 6/30/09 new normal reference range: <= 0.59 mg/L FEU  Increases in D-dimer concentration observed with  thromboembolic events can be variable due to localization,  size and age of the thrombus. Therefore, a thromboembolic  event cannot be diagnosed with certainty on the basis of the  reference range.  D-dimers may also be elevated for a variety of disorders   including: advanced age, pregnancy, coronary disease, cancer,  liver disease, infection, inflammation, hematoma, DIC, trauma,  post surgery, diabetes, thrombolytic therapy, stress, and  general hospitalization. D-dimer levels may be decreased  in  patients on anticoagulant therapy.      No results found.   @ECG 12 Lead   Preliminary Result    HEART RATE= 75  bpm    RR Interval= 800  ms    AL Interval= 151  ms    P Horizontal Axis= 23  deg    P Front Axis= 20  deg    QRSD Interval= 91  ms    QT Interval= 398  ms    QRS Axis= -4  deg    T Wave Axis= 0  deg    - OTHERWISE NORMAL ECG -    Sinus rhythm    Low voltage, precordial leads    Electronically Signed By:     Date and Time of Study: 2021-12-04 08:36:44        Symptoms better after neb tx. Vitals stable.    The patient will pursue further outpatient evaluation with the primary care physician or other designated or consulting physician as outlined in the discharge instructions. They are agreeable to this plan of care and follow-up instructions have been explained in detail. The patient has received these instructions in written format and have expressed an understanding of the discharge instructions. The patient is aware that any significant change in condition or worsening of symptoms should prompt an immediate return to this or the closest emergency department or call to 911.  Pt is otherwise non toxic and stable for d/c home.  Pt is in agreement with this.  All questions answered at bedside.          Amount and/or Complexity of Data Reviewed  Clinical lab tests: reviewed  Tests in the radiology section of CPT®: reviewed  Tests in the medicine section of CPT®: reviewed  Independent visualization of images, tracings, or specimens: yes    Risk of Complications, Morbidity, and/or Mortality  Presenting problems: moderate  Diagnostic procedures: moderate  Management options: moderate    Patient Progress  Patient progress: stable      Final diagnoses:   Bronchospasm   COVID-19       ED Disposition  ED Disposition     ED Disposition Condition Comment    Discharge Stable           Provider, No Known  [facility]  [address] [place] [id number]    Schedule an appointment as soon as possible for a visit in 1  week  for further eval         Medication List      New Prescriptions    albuterol 1.25 MG/3ML nebulizer solution  Commonly known as: ACCUNEB  Take 3 mL by nebulization Every 6 (Six) Hours As Needed for Shortness of Air.           Where to Get Your Medications      These medications were sent to [id number] - [place], [place] - [address] AT [facility] - [phone]  - [phone]   [address] [place] [address]    Hours: 24-hours Phone: [phone]   · albuterol 1.25 MG/3ML nebulizer solution          John Figueroa PA  12/04/21 1009

## 2021-12-04 NOTE — ED NOTES
Pt. Is in no acute distress and has no questions upon discharge.      Atul Bridges, RN  12/04/21 2612

## 2022-02-18 ENCOUNTER — OFFICE VISIT (OUTPATIENT)
Dept: FAMILY MEDICINE CLINIC | Facility: CLINIC | Age: 57
End: 2022-02-18

## 2022-02-18 VITALS
SYSTOLIC BLOOD PRESSURE: 122 MMHG | TEMPERATURE: 97.7 F | DIASTOLIC BLOOD PRESSURE: 72 MMHG | HEART RATE: 88 BPM | BODY MASS INDEX: 37.08 KG/M2 | WEIGHT: 217.2 LBS | OXYGEN SATURATION: 97 % | HEIGHT: 64 IN | RESPIRATION RATE: 18 BRPM

## 2022-02-18 DIAGNOSIS — Z12.31 ENCOUNTER FOR SCREENING MAMMOGRAM FOR MALIGNANT NEOPLASM OF BREAST: ICD-10-CM

## 2022-02-18 DIAGNOSIS — Z23 NEED FOR IMMUNIZATION AGAINST INFLUENZA: Primary | ICD-10-CM

## 2022-02-18 DIAGNOSIS — Z23 NEED FOR SHINGLES VACCINE: ICD-10-CM

## 2022-02-18 DIAGNOSIS — Z12.11 SCREEN FOR COLON CANCER: ICD-10-CM

## 2022-02-18 DIAGNOSIS — R00.2 PALPITATIONS: ICD-10-CM

## 2022-02-18 DIAGNOSIS — Z23 NEED FOR VACCINATION AGAINST STREPTOCOCCUS PNEUMONIAE: ICD-10-CM

## 2022-02-18 DIAGNOSIS — Z79.890 HORMONE REPLACEMENT THERAPY: ICD-10-CM

## 2022-02-18 DIAGNOSIS — Z23 NEED FOR INFLUENZA VACCINATION: ICD-10-CM

## 2022-02-18 DIAGNOSIS — E03.9 ACQUIRED HYPOTHYROIDISM: ICD-10-CM

## 2022-02-18 DIAGNOSIS — E55.9 VITAMIN D DEFICIENCY: ICD-10-CM

## 2022-02-18 DIAGNOSIS — Z11.59 NEED FOR HEPATITIS C SCREENING TEST: ICD-10-CM

## 2022-02-18 DIAGNOSIS — E11.65 TYPE 2 DIABETES MELLITUS WITH HYPERGLYCEMIA, WITHOUT LONG-TERM CURRENT USE OF INSULIN: ICD-10-CM

## 2022-02-18 DIAGNOSIS — Z23 NEED FOR TDAP VACCINATION: ICD-10-CM

## 2022-02-18 DIAGNOSIS — Z00.00 ANNUAL PHYSICAL EXAM: ICD-10-CM

## 2022-02-18 PROBLEM — E11.9 DIABETES: Status: ACTIVE | Noted: 2022-02-18

## 2022-02-18 PROBLEM — R68.89 FORGETFULNESS: Status: ACTIVE | Noted: 2022-02-18

## 2022-02-18 PROBLEM — I63.9 STROKE: Status: ACTIVE | Noted: 2022-02-18

## 2022-02-18 PROBLEM — N80.9 ENDOMETRIOSIS: Status: ACTIVE | Noted: 2022-02-18

## 2022-02-18 PROBLEM — E78.5 HYPERLIPEMIA: Status: ACTIVE | Noted: 2022-02-18

## 2022-02-18 PROBLEM — D64.9 ANEMIA: Status: ACTIVE | Noted: 2022-02-18

## 2022-02-18 PROCEDURE — 99214 OFFICE O/P EST MOD 30 MIN: CPT | Performed by: NURSE PRACTITIONER

## 2022-02-18 RX ORDER — ALBUTEROL SULFATE 90 UG/1
AEROSOL, METERED RESPIRATORY (INHALATION)
COMMUNITY
Start: 2022-01-20 | End: 2022-03-23

## 2022-02-18 RX ORDER — PNEUMOCOCCAL VACCINE POLYVALENT 25; 25; 25; 25; 25; 25; 25; 25; 25; 25; 25; 25; 25; 25; 25; 25; 25; 25; 25; 25; 25; 25; 25 UG/.5ML; UG/.5ML; UG/.5ML; UG/.5ML; UG/.5ML; UG/.5ML; UG/.5ML; UG/.5ML; UG/.5ML; UG/.5ML; UG/.5ML; UG/.5ML; UG/.5ML; UG/.5ML; UG/.5ML; UG/.5ML; UG/.5ML; UG/.5ML; UG/.5ML; UG/.5ML; UG/.5ML; UG/.5ML; UG/.5ML
0.5 INJECTION, SOLUTION INTRAMUSCULAR; SUBCUTANEOUS ONCE
Qty: 0.5 ML | Refills: 0 | Status: SHIPPED | OUTPATIENT
Start: 2022-02-18 | End: 2022-02-18

## 2022-02-18 RX ORDER — TETANUS TOXOID, REDUCED DIPHTHERIA TOXOID AND ACELLULAR PERTUSSIS VACCINE, ADSORBED 5; 2.5; 8; 8; 2.5 [IU]/.5ML; [IU]/.5ML; UG/.5ML; UG/.5ML; UG/.5ML
0.5 SUSPENSION INTRAMUSCULAR ONCE
Qty: 0.5 ML | Refills: 0 | Status: SHIPPED | OUTPATIENT
Start: 2022-02-18 | End: 2022-02-18

## 2022-02-18 NOTE — PROGRESS NOTES
Chief Complaint  Annual Exam, Diabetes (f/u), Hypothyroidism (f/u), and Palpitations (sometimes)    Subjective        Zahira Ramirez presents to [facility]  Diabetes:  Had been going to a wellness clinic and has found that the horones have worked well but the berberine didn't manage h er blood sugars.    Hormone replacement therapy doing well on progesterone.    Hypothyroid:  Doing well on current dose of T3/T4 37-13.5        Past History:    Medical History: has a past medical history of Anemia, Broken bones, Endometriosis, Forgetfulness, Hyperlipemia, Hypothyroidism, and Stroke (HCC).     Surgical History: has a past surgical history that includes Laser ablation; Carpal tunnel release;  section; Abdominal hysterectomy; Other surgical history; and Tubal ligation.     Family History: family history includes Aneurysm in her mother; Cervical cancer in her mother; Heart disease in an other family member; Stroke in an other family member; Uterine cancer in an other family member.     Social History: reports that she has quit smoking. Her smoking use included cigarettes. She smoked 1.00 pack per day. She has never used smokeless tobacco. She reports current alcohol use. She reports that she does not use drugs.    Allergies: Patient has no known allergies.          Current Outpatient Medications:   •  Accu-Chek Guide test strip, USE TO TEST ONCE DAILY, Disp: 100 each, Rfl: 1  •  albuterol (ACCUNEB) 1.25 MG/3ML nebulizer solution, Take 3 mL by nebulization Every 6 (Six) Hours As Needed for Shortness of Air., Disp: 1 each, Rfl: 0  •  albuterol sulfate  (90 Base) MCG/ACT inhaler, , Disp: , Rfl:   •  metFORMIN (GLUCOPHAGE) 500 MG tablet, Take 1 tablet by mouth 2 (Two) Times a Day With Meals., Disp: 90 tablet, Rfl: 1  •  vitamin D (ERGOCALCIFEROL) 1.25 MG (42549 UT) capsule capsule, TAKE 1 CAPSULE BY MOUTH 1 TIME WEEKLY, Disp: 13 capsule, Rfl: 1  •  influenza vac split  "quad (FLUZONE,FLUARIX,AFLURIA,FLULAVAL) 0.5 ML suspension prefilled syringe injection, Inject 0.5 mL into the appropriate muscle as directed by prescriber 1 (One) Time for 1 dose., Disp: 0.5 mL, Rfl: 0  •  liotrix 60 (12.5-50) MG (MCG) tablet, Take 37 mcg by mouth Daily., Disp: 90 tablet, Rfl: 1  •  pneumococcal polysaccharide 23-valent (Pneumovax 23) 25 MCG/0.5ML vaccine, Inject 0.5 mL into the appropriate muscle as directed by prescriber 1 (One) Time for 1 dose., Disp: 0.5 mL, Rfl: 0  •  Progesterone 100 MG capsule, Take 150 mg by mouth Daily., Disp: 90 tablet, Rfl: 1  •  Tdap (Boostrix) 5-2.5-18.5 LF-MCG/0.5 injection, Inject 0.5 mL into the appropriate muscle as directed by prescriber 1 (One) Time for 1 dose., Disp: 0.5 mL, Rfl: 0  •  Zoster Vac Recomb Adjuvanted 50 MCG/0.5ML reconstituted suspension, Inject 0.5 mL into the appropriate muscle as directed by prescriber 1 (One) Time for 1 dose., Disp: 1 each, Rfl: 1    Medications Discontinued During This Encounter   Medication Reason   • methylPREDNISolone (MEDROL) 4 MG dose pack *Error   • Progesterone Micronized (PROGESTERONE COMPOUNDING KIT TD) *Therapy completed   • metFORMIN (GLUCOPHAGE) 500 MG tablet Reorder         Review of Systems   Constitutional: Negative for fever.   Respiratory: Negative for cough and shortness of breath.    Cardiovascular: Negative for chest pain, palpitations and leg swelling.   Neurological: Negative for dizziness, weakness, numbness and headache.        Objective         Vitals:    02/18/22 0801   BP: 122/72   BP Location: Right arm   Patient Position: Sitting   Cuff Size: Large Adult   Pulse: 88   Resp: 18   Temp: 97.7 °F (36.5 °C)   TempSrc: Infrared   SpO2: 97%   Weight: 98.5 kg (217 lb 3.2 oz)   Height: 162.6 cm (64\")     Body mass index is 37.28 kg/m².         Physical Exam  Vitals reviewed.   Constitutional:       Appearance: Normal appearance. She is well-developed.   HENT:      Head: Normocephalic and atraumatic.      " Mouth/Throat:      Pharynx: No oropharyngeal exudate.   Eyes:      Conjunctiva/sclera: Conjunctivae normal.      Pupils: Pupils are equal, round, and reactive to light.   Cardiovascular:      Rate and Rhythm: Normal rate and regular rhythm.      Heart sounds: Normal heart sounds. No murmur heard.  No friction rub. No gallop.    Pulmonary:      Effort: Pulmonary effort is normal.      Breath sounds: Normal breath sounds. No wheezing or rhonchi.   Skin:     General: Skin is warm and dry.   Neurological:      Mental Status: She is alert and oriented to person, place, and time.   Psychiatric:         Mood and Affect: Mood and affect normal.         Behavior: Behavior normal.         Thought Content: Thought content normal.         Judgment: Judgment normal.             Result Review :               Assessment and Plan     Diagnoses and all orders for this visit:    1. Need for immunization against influenza (Primary)  -     influenza vac split quad (FLUZONE,FLUARIX,AFLURIA,FLULAVAL) 0.5 ML suspension prefilled syringe injection; Inject 0.5 mL into the appropriate muscle as directed by prescriber 1 (One) Time for 1 dose.  Dispense: 0.5 mL; Refill: 0    2. Screen for colon cancer  -     Amb referral for Screening Colonoscopy    3. Encounter for screening mammogram for malignant neoplasm of breast  -     Mammo Screening Digital Tomosynthesis Bilateral With CAD    4. Need for hepatitis C screening test  -     Hepatitis C antibody; Future    5. Acquired hypothyroidism  -     liotrix 60 (12.5-50) MG (MCG) tablet; Take 37 mcg by mouth Daily.  Dispense: 90 tablet; Refill: 1  -     T3, free; Future  -     T4, free; Future  -     TSH; Future  -     T4, free; Future  -     T3, free; Future  -     TSH; Future    6. Hormone replacement therapy  -     Progesterone 100 MG capsule; Take 150 mg by mouth Daily.  Dispense: 90 tablet; Refill: 1  -     Progesterone; Future  -     Progesterone; Future    7. Type 2 diabetes mellitus with  hyperglycemia, without long-term current use of insulin (HCC)  -     metFORMIN (GLUCOPHAGE) 500 MG tablet; Take 1 tablet by mouth 2 (Two) Times a Day With Meals.  Dispense: 90 tablet; Refill: 1  -     Comprehensive metabolic panel; Future  -     Lipid Panel w/ Chol/HDL Ratio; Future  -     Hemoglobin A1c; Future  -     CBC w AUTO Differential; Future  -     Hemoglobin A1c; Future  -     Lipid Panel w/ Chol/HDL Ratio; Future  -     Comprehensive metabolic panel; Future  -     Urinalysis With Culture If Indicated -; Future  -     CBC w AUTO Differential; Future    8. Palpitations  -     Cardiac event monitor; Future    9. Need for Tdap vaccination  -     Tdap (Boostrix) 5-2.5-18.5 LF-MCG/0.5 injection; Inject 0.5 mL into the appropriate muscle as directed by prescriber 1 (One) Time for 1 dose.  Dispense: 0.5 mL; Refill: 0    10. Need for shingles vaccine  -     Zoster Vac Recomb Adjuvanted 50 MCG/0.5ML reconstituted suspension; Inject 0.5 mL into the appropriate muscle as directed by prescriber 1 (One) Time for 1 dose.  Dispense: 1 each; Refill: 1    11. Need for vaccination against Streptococcus pneumoniae  -     pneumococcal polysaccharide 23-valent (Pneumovax 23) 25 MCG/0.5ML vaccine; Inject 0.5 mL into the appropriate muscle as directed by prescriber 1 (One) Time for 1 dose.  Dispense: 0.5 mL; Refill: 0    12. Need for influenza vaccination    13. Vitamin D deficiency  -     Vitamin D 25 hydroxy; Future    Other orders  -     Cancel: Flulaval/Fluarix/Fluzone >6 Months ([id number])              Follow Up     No follow-ups on file.    Patient was given instructions and counseling regarding her condition or for health maintenance advice. Please see specific information pulled into the AVS if appropriate.

## 2022-02-22 ENCOUNTER — LAB (OUTPATIENT)
Dept: LAB | Facility: HOSPITAL | Age: 57
End: 2022-02-22

## 2022-02-22 DIAGNOSIS — E03.9 ACQUIRED HYPOTHYROIDISM: ICD-10-CM

## 2022-02-22 DIAGNOSIS — E55.9 VITAMIN D DEFICIENCY: ICD-10-CM

## 2022-02-22 DIAGNOSIS — Z11.59 NEED FOR HEPATITIS C SCREENING TEST: ICD-10-CM

## 2022-02-22 DIAGNOSIS — Z79.890 HORMONE REPLACEMENT THERAPY: ICD-10-CM

## 2022-02-22 DIAGNOSIS — E11.65 TYPE 2 DIABETES MELLITUS WITH HYPERGLYCEMIA, WITHOUT LONG-TERM CURRENT USE OF INSULIN: ICD-10-CM

## 2022-02-22 LAB
25(OH)D3 SERPL-MCNC: 53 NG/ML (ref 30–100)
ALBUMIN SERPL-MCNC: 4.2 G/DL (ref 3.5–5.2)
ALBUMIN/GLOB SERPL: 1.6 G/DL
ALP SERPL-CCNC: 64 U/L (ref 39–117)
ALT SERPL W P-5'-P-CCNC: 20 U/L (ref 1–33)
ANION GAP SERPL CALCULATED.3IONS-SCNC: 8.7 MMOL/L (ref 5–15)
AST SERPL-CCNC: 14 U/L (ref 1–32)
BASOPHILS # BLD AUTO: 0.06 10*3/MM3 (ref 0–0.2)
BASOPHILS NFR BLD AUTO: 1 % (ref 0–1.5)
BILIRUB SERPL-MCNC: 0.2 MG/DL (ref 0–1.2)
BUN SERPL-MCNC: 17 MG/DL (ref 6–20)
BUN/CREAT SERPL: 25.8 (ref 7–25)
CALCIUM SPEC-SCNC: 9.3 MG/DL (ref 8.6–10.5)
CHLORIDE SERPL-SCNC: 103 MMOL/L (ref 98–107)
CHOLEST SERPL-MCNC: 203 MG/DL (ref 0–200)
CO2 SERPL-SCNC: 30.3 MMOL/L (ref 22–29)
CREAT SERPL-MCNC: 0.66 MG/DL (ref 0.57–1)
DEPRECATED RDW RBC AUTO: 38.5 FL (ref 37–54)
EOSINOPHIL # BLD AUTO: 0.15 10*3/MM3 (ref 0–0.4)
EOSINOPHIL NFR BLD AUTO: 2.6 % (ref 0.3–6.2)
ERYTHROCYTE [DISTWIDTH] IN BLOOD BY AUTOMATED COUNT: 12.9 % (ref 12.3–15.4)
GFR SERPL CREATININE-BSD FRML MDRD: 93 ML/MIN/1.73
GLOBULIN UR ELPH-MCNC: 2.6 GM/DL
GLUCOSE SERPL-MCNC: 112 MG/DL (ref 65–99)
HBA1C MFR BLD: 5 % (ref 4.8–5.6)
HCT VFR BLD AUTO: 37.5 % (ref 34–46.6)
HCV AB SER DONR QL: NORMAL
HDLC SERPL QL: 5.08
HDLC SERPL-MCNC: 40 MG/DL (ref 40–60)
HGB BLD-MCNC: 12.6 G/DL (ref 12–15.9)
IMM GRANULOCYTES # BLD AUTO: 0.01 10*3/MM3 (ref 0–0.05)
IMM GRANULOCYTES NFR BLD AUTO: 0.2 % (ref 0–0.5)
LDLC SERPL CALC-MCNC: 136 MG/DL (ref 0–100)
LYMPHOCYTES # BLD AUTO: 1.5 10*3/MM3 (ref 0.7–3.1)
LYMPHOCYTES NFR BLD AUTO: 26 % (ref 19.6–45.3)
MCH RBC QN AUTO: 27.9 PG (ref 26.6–33)
MCHC RBC AUTO-ENTMCNC: 33.6 G/DL (ref 31.5–35.7)
MCV RBC AUTO: 83 FL (ref 79–97)
MONOCYTES # BLD AUTO: 0.47 10*3/MM3 (ref 0.1–0.9)
MONOCYTES NFR BLD AUTO: 8.1 % (ref 5–12)
NEUTROPHILS NFR BLD AUTO: 3.59 10*3/MM3 (ref 1.7–7)
NEUTROPHILS NFR BLD AUTO: 62.1 % (ref 42.7–76)
NRBC BLD AUTO-RTO: 0 /100 WBC (ref 0–0.2)
PLATELET # BLD AUTO: 282 10*3/MM3 (ref 140–450)
PMV BLD AUTO: 10.1 FL (ref 6–12)
POTASSIUM SERPL-SCNC: 4.1 MMOL/L (ref 3.5–5.2)
PROGEST SERPL-MCNC: 0.06 NG/ML
PROT SERPL-MCNC: 6.8 G/DL (ref 6–8.5)
RBC # BLD AUTO: 4.52 10*6/MM3 (ref 3.77–5.28)
SODIUM SERPL-SCNC: 142 MMOL/L (ref 136–145)
T3FREE SERPL-MCNC: 3.01 PG/ML (ref 2–4.4)
T4 FREE SERPL-MCNC: 0.9 NG/DL (ref 0.93–1.7)
TRIGL SERPL-MCNC: 150 MG/DL (ref 0–150)
TSH SERPL DL<=0.05 MIU/L-ACNC: 1.94 UIU/ML (ref 0.27–4.2)
VLDLC SERPL-MCNC: 27 MG/DL (ref 5–40)
WBC NRBC COR # BLD: 5.78 10*3/MM3 (ref 3.4–10.8)

## 2022-02-22 PROCEDURE — 86803 HEPATITIS C AB TEST: CPT

## 2022-02-22 PROCEDURE — 84481 FREE ASSAY (FT-3): CPT

## 2022-02-22 PROCEDURE — 82306 VITAMIN D 25 HYDROXY: CPT

## 2022-02-22 PROCEDURE — 85025 COMPLETE CBC W/AUTO DIFF WBC: CPT

## 2022-02-22 PROCEDURE — 36415 COLL VENOUS BLD VENIPUNCTURE: CPT

## 2022-02-22 PROCEDURE — 84439 ASSAY OF FREE THYROXINE: CPT

## 2022-02-22 PROCEDURE — 83036 HEMOGLOBIN GLYCOSYLATED A1C: CPT

## 2022-02-22 PROCEDURE — 84443 ASSAY THYROID STIM HORMONE: CPT

## 2022-02-22 PROCEDURE — 80053 COMPREHEN METABOLIC PANEL: CPT

## 2022-02-22 PROCEDURE — 80061 LIPID PANEL: CPT

## 2022-02-22 PROCEDURE — 84144 ASSAY OF PROGESTERONE: CPT

## 2022-02-23 ENCOUNTER — TELEPHONE (OUTPATIENT)
Dept: FAMILY MEDICINE CLINIC | Facility: CLINIC | Age: 57
End: 2022-02-23

## 2022-02-23 RX ORDER — LEVOTHYROXINE AND LIOTHYRONINE 57; 13.5 UG/1; UG/1
90 TABLET ORAL DAILY
Qty: 90 TABLET | Refills: 1 | Status: SHIPPED | OUTPATIENT
Start: 2022-02-23 | End: 2022-02-23

## 2022-02-23 RX ORDER — LEVOTHYROXINE AND LIOTHYRONINE 57; 13.5 UG/1; UG/1
90 TABLET ORAL DAILY
COMMUNITY
End: 2022-02-23 | Stop reason: SDUPTHER

## 2022-02-23 RX ORDER — LEVOTHYROXINE AND LIOTHYRONINE 57; 13.5 UG/1; UG/1
90 TABLET ORAL DAILY
Qty: 90 TABLET | Refills: 1 | Status: SHIPPED | OUTPATIENT
Start: 2022-02-23 | End: 2022-07-25

## 2022-02-23 NOTE — TELEPHONE ENCOUNTER
Spoke to the pharmacist, the compounded medication is only done for the provider that wrote it, she has a contract with them.  The NP thyroid is the same medication off the shelf.  Informed pt and she is fine with it.

## 2022-02-23 NOTE — TELEPHONE ENCOUNTER
----- Message from Zahira Ramirez sent at 2/22/2022  5:52 PM EST -----  Regarding: Thyroid  Kelvin - When I picked up my thyroid medication, it is the NP Thyroid 90mg- is there a reason why I didn't get my compounded thyroid that I have been taking?

## 2022-02-28 ENCOUNTER — APPOINTMENT (OUTPATIENT)
Dept: MAMMOGRAPHY | Facility: HOSPITAL | Age: 57
End: 2022-02-28

## 2022-03-04 ENCOUNTER — HOSPITAL ENCOUNTER (OUTPATIENT)
Dept: CARDIOLOGY | Facility: HOSPITAL | Age: 57
Discharge: HOME | End: 2022-03-04

## 2022-03-04 ENCOUNTER — TELEPHONE (OUTPATIENT)
Dept: FAMILY MEDICINE CLINIC | Facility: CLINIC | Age: 57
End: 2022-03-04

## 2022-03-04 DIAGNOSIS — R00.2 PALPITATIONS: Primary | ICD-10-CM

## 2022-03-04 DIAGNOSIS — R00.2 PALPITATIONS: ICD-10-CM

## 2022-03-04 NOTE — TELEPHONE ENCOUNTER
----- Message from Zahira Ramirez sent at 3/4/2022  1:29 PM EST -----  Regarding: Cardiologist  Would you please get me in with cardiologists Dr. Fortunato Odom or Caden Quezada in [facility] since I am on the monitor thing I am thinking I would like a specialist to go to as well.

## 2022-03-23 ENCOUNTER — OFFICE VISIT (OUTPATIENT)
Dept: FAMILY MEDICINE CLINIC | Facility: CLINIC | Age: 57
End: 2022-03-23

## 2022-03-23 VITALS
HEART RATE: 76 BPM | WEIGHT: 225.2 LBS | SYSTOLIC BLOOD PRESSURE: 130 MMHG | BODY MASS INDEX: 38.45 KG/M2 | HEIGHT: 64 IN | OXYGEN SATURATION: 98 % | DIASTOLIC BLOOD PRESSURE: 72 MMHG | TEMPERATURE: 97.8 F | RESPIRATION RATE: 16 BRPM

## 2022-03-23 DIAGNOSIS — E11.9 TYPE 2 DIABETES MELLITUS WITHOUT COMPLICATION, WITHOUT LONG-TERM CURRENT USE OF INSULIN: ICD-10-CM

## 2022-03-23 DIAGNOSIS — M79.672 LEFT FOOT PAIN: Primary | ICD-10-CM

## 2022-03-23 PROCEDURE — 99213 OFFICE O/P EST LOW 20 MIN: CPT | Performed by: NURSE PRACTITIONER

## 2022-03-23 RX ORDER — CEFDINIR 300 MG/1
300 CAPSULE ORAL 2 TIMES DAILY
Qty: 20 CAPSULE | Refills: 0 | Status: SHIPPED | OUTPATIENT
Start: 2022-03-23 | End: 2022-04-14

## 2022-03-23 NOTE — PROGRESS NOTES
Answers for HPI/ROS submitted by the patient on 3/21/2022  Please describe your symptoms.: Big bump on the bottom of my left foot under the toes. It keeps getting larger and its becoming painful to wear shoes and walk.  Have you had these symptoms before?: No  How long have you been having these symptoms?: Greater than 2 weeks  Please list any medications you are currently taking for this condition.: Advil/tylenol dual  Please describe any probable cause for these symptoms. : I dont know what it is.  What is the primary reason for your visit?: Other    Chief Complaint  Abscess (Left foot, causing issues walking)    Subjective        Zahira Ramirez presents to [facility]  Left foot with raised area proximal to left 4th toe in the last month.  Making more painful to walk on foot.  Doesn't remember stepping on any foreign object.  No new shoes and change often.    Abscess  Pertinent negatives include no chest pain, coughing, fever, numbness or weakness.         Past History:    Medical History: has a past medical history of Anemia, Broken bones, Diabetes mellitus (HCC), Endometriosis, Forgetfulness, Heart murmur ( this year), Hyperlipemia, Hypothyroidism, Obesity, Pneumonia (Early July), and Stroke ([facility]).     Surgical History: has a past surgical history that includes Laser ablation; Carpal tunnel release;  section; Abdominal hysterectomy; Other surgical history; Tubal ligation; and Endometrial ablation.     Family History: family history includes Aneurysm in her mother; Cervical cancer in her mother; Diabetes in her mother; Heart disease in her father and another family member; Hypertension in her father; Stroke in her paternal grandmother and another family member; Uterine cancer in an other family member.     Social History: reports that she has quit smoking. Her smoking use included cigarettes. She smoked 1.00 pack per day. She has never used smokeless tobacco.  "She reports current alcohol use of about 1.0 standard drink of alcohol per week. She reports that she does not use drugs.    Allergies: Patient has no known allergies.          Current Outpatient Medications:   •  metFORMIN (GLUCOPHAGE) 500 MG tablet, Take 1 tablet by mouth 2 (Two) Times a Day With Meals., Disp: 90 tablet, Rfl: 1  •  Progesterone 100 MG capsule, Take 150 mg by mouth Daily., Disp: 90 tablet, Rfl: 1  •  Thyroid 90 MG PO tablet, Take 1 tablet by mouth Daily., Disp: 90 tablet, Rfl: 1  •  vitamin D (ERGOCALCIFEROL) 1.25 MG (44835 UT) capsule capsule, TAKE 1 CAPSULE BY MOUTH 1 TIME WEEKLY, Disp: 13 capsule, Rfl: 1  •  Accu-Chek Guide test strip, USE TO TEST ONCE DAILY, Disp: 100 each, Rfl: 1  •  cefdinir (OMNICEF) 300 MG capsule, Take 1 capsule by mouth 2 (Two) Times a Day., Disp: 20 capsule, Rfl: 0  •  PROGESTERONE MICRONIZED PO, Take 150 mg by mouth Daily., Disp: , Rfl:     Medications Discontinued During This Encounter   Medication Reason   • albuterol sulfate  (90 Base) MCG/ACT inhaler *Therapy completed   • albuterol (ACCUNEB) 1.25 MG/3ML nebulizer solution *Therapy completed         Review of Systems   Constitutional: Negative for fever.   Respiratory: Negative for cough and shortness of breath.    Cardiovascular: Negative for chest pain, palpitations and leg swelling.   Neurological: Negative for dizziness, weakness, numbness and headache.        Objective         Vitals:    03/23/22 0951   BP: 130/72   BP Location: Right arm   Patient Position: Sitting   Cuff Size: Large Adult   Pulse: 76   Resp: 16   Temp: 97.8 °F (36.6 °C)   TempSrc: Infrared   SpO2: 98%   Weight: 102 kg (225 lb 3.2 oz)   Height: 162.6 cm (64.02\")     Body mass index is 38.64 kg/m².         Physical Exam  Vitals reviewed.   Constitutional:       Appearance: Normal appearance. She is well-developed.   HENT:      Head: Normocephalic and atraumatic.      Mouth/Throat:      Pharynx: No oropharyngeal exudate.   Eyes:      " Conjunctiva/sclera: Conjunctivae normal.      Pupils: Pupils are equal, round, and reactive to light.   Cardiovascular:      Rate and Rhythm: Normal rate and regular rhythm.      Pulses:           Dorsalis pedis pulses are 2+ on the right side and 2+ on the left side.      Heart sounds: Normal heart sounds. No murmur heard.    No friction rub. No gallop.   Pulmonary:      Effort: Pulmonary effort is normal.      Breath sounds: Normal breath sounds. No wheezing or rhonchi.   Musculoskeletal:        Feet:    Feet:      Right foot:      Protective Sensation: 10 sites tested. 3 sites sensed.      Skin integrity: Skin integrity normal. No ulcer or blister.      Toenail Condition: Right toenails are normal.      Left foot:      Protective Sensation: 10 sites tested. 3 sites sensed.      Skin integrity: Skin integrity normal. No ulcer or blister.      Toenail Condition: Left toenails are normal.      Comments:    painful area at 4th digit.  Callus vs start of ulcer    Other marked areas are calluses  Skin:     General: Skin is warm and dry.   Neurological:      Mental Status: She is alert and oriented to person, place, and time.   Psychiatric:         Mood and Affect: Mood and affect normal.         Behavior: Behavior normal.         Thought Content: Thought content normal.         Judgment: Judgment normal.             Result Review :               Assessment and Plan     Diagnoses and all orders for this visit:    1. Left foot pain (Primary)  Comments:  callus vs wart.  Orders:  -     Ambulatory Referral to Podiatry  -     XR Foot 3+ View Left; Future  -     cefdinir (OMNICEF) 300 MG capsule; Take 1 capsule by mouth 2 (Two) Times a Day.  Dispense: 20 capsule; Refill: 0    2. Type 2 diabetes mellitus without complication, without long-term current use of insulin ([facility])              Follow Up     No follow-ups on file.    Patient was given instructions and counseling regarding her condition or for health maintenance advice.  Please see specific information pulled into the AVS if appropriate.

## 2022-03-25 ENCOUNTER — OFFICE VISIT (OUTPATIENT)
Dept: PODIATRY | Facility: CLINIC | Age: 57
End: 2022-03-25

## 2022-03-25 VITALS
HEART RATE: 66 BPM | OXYGEN SATURATION: 98 % | DIASTOLIC BLOOD PRESSURE: 55 MMHG | BODY MASS INDEX: 38.58 KG/M2 | TEMPERATURE: 97.1 F | WEIGHT: 226 LBS | SYSTOLIC BLOOD PRESSURE: 130 MMHG | HEIGHT: 64 IN

## 2022-03-25 DIAGNOSIS — M79.5 FOREIGN BODY (FB) IN SOFT TISSUE: ICD-10-CM

## 2022-03-25 DIAGNOSIS — M79.672 FOOT PAIN, LEFT: Primary | ICD-10-CM

## 2022-03-25 DIAGNOSIS — E11.9 NON-INSULIN DEPENDENT TYPE 2 DIABETES MELLITUS: ICD-10-CM

## 2022-03-25 DIAGNOSIS — E11.8 DIABETIC FOOT: ICD-10-CM

## 2022-03-25 PROCEDURE — 28190 REMOVAL OF FOOT FOREIGN BODY: CPT | Performed by: PODIATRIST

## 2022-03-25 PROCEDURE — G8404 LOW EXTEMITY NEUR EXAM DOCUM: HCPCS | Performed by: PODIATRIST

## 2022-03-25 PROCEDURE — 99243 OFF/OP CNSLTJ NEW/EST LOW 30: CPT | Performed by: PODIATRIST

## 2022-03-25 NOTE — PROGRESS NOTES
[facility] - PODIATRY    Today's Date: 22    Patient Name: Zahira Ramirez  MRN: [id number]  CSN: [id number]  PCP: Kelvin Khan APRN, Last PCP Visit: 2022  Referring Provider: Kelvin Khan APRN    SUBJECTIVE     Chief Complaint   Patient presents with   • Left Foot - Pain     Possible callus     HPI: Zahira Ramirez, a 56 y.o.female, presents to clinic for a chief complaint of pain in her left fourth metatarsal head and diabetic foot evaluation.    New, Established, New Problem: New    Started early 2022: Insidiously    Onset: Insidious    Nature: Sore, sharp    Stable, worsening, improving: No improvement.  Currently taking p.o. antibiotics    Patient controlling diabetes via: Controlled, NIDDM    Patient states there last blood glucose was:  102    Patient denies any fevers, chills, nausea, vomiting, shortness of breath, nor any other constitutional signs nor symptoms.    No other pedal complaints at this time.    Past Medical History:   Diagnosis Date   • Anemia    • Broken bones    • Diabetes mellitus (HCC)     Type 2   • Endometriosis    • Forgetfulness    • Heart murmur  this year    Had a big flutter and felt like i was gonna pass out.   • Hyperlipemia    • Hypothyroidism    • Obesity    • Pneumonia Early July   • Stroke (HCC)      Past Surgical History:   Procedure Laterality Date   • ABDOMINAL HYSTERECTOMY     • CARPAL TUNNEL RELEASE     •  SECTION     • ENDOMETRIAL ABLATION     • LASER ABLATION     • OTHER SURGICAL HISTORY      Laparotomy   • TUBAL ABDOMINAL LIGATION       Family History   Problem Relation Age of Onset   • Cervical cancer Mother         Malignant   • Aneurysm Mother    • Diabetes Mother    • Stroke Other    • Heart disease Other    • Uterine cancer Other    • Heart disease Father    • Hypertension Father    • Stroke Paternal Grandmother      Social History     Socioeconomic History   • Marital status:    Tobacco  Use   • Smoking status: Former Smoker     Packs/day: 1.00     Types: Cigarettes   • Smokeless tobacco: Never Used   • Tobacco comment: quit at age 42. second hand smoke exposure-never   Vaping Use   • Vaping Use: Never used   Substance and Sexual Activity   • Alcohol use: Yes     Alcohol/week: 1.0 standard drink     Types: 1 Glasses of wine per week     Comment: drinks rarely; wine   • Drug use: Never   • Sexual activity: Yes     Partners: Male     Birth control/protection: None     No Known Allergies  Current Outpatient Medications   Medication Sig Dispense Refill   • Accu-Chek Guide test strip USE TO TEST ONCE DAILY 100 each 1   • cefdinir (OMNICEF) 300 MG capsule Take 1 capsule by mouth 2 (Two) Times a Day. 20 capsule 0   • diclofenac (VOLTAREN) 50 MG EC tablet Take 1 tablet by mouth 2 (Two) Times a Day. 60 tablet 1   • metFORMIN (GLUCOPHAGE) 500 MG tablet Take 1 tablet by mouth 2 (Two) Times a Day With Meals. 90 tablet 1   • Progesterone 100 MG capsule Take 150 mg by mouth Daily. 90 tablet 1   • PROGESTERONE MICRONIZED PO Take 150 mg by mouth Daily.     • Thyroid 90 MG PO tablet Take 1 tablet by mouth Daily. 90 tablet 1   • vitamin D (ERGOCALCIFEROL) 1.25 MG (94861 UT) capsule capsule TAKE 1 CAPSULE BY MOUTH 1 TIME WEEKLY 13 capsule 1     No current facility-administered medications for this visit.     Review of Systems   Skin:        Pain in plantar left fourth metatarsal head area   All other systems reviewed and are negative.      OBJECTIVE     Vitals:    03/25/22 0902   BP: 130/55   Pulse: 66   Temp: 97.1 °F (36.2 °C)   SpO2: 98%       Body mass index is 38.77 kg/m².    Lab Results   Component Value Date    HGBA1C 5.00 02/22/2022       Lab Results   Component Value Date    GLUCOSE 112 (H) 02/22/2022    CALCIUM 9.3 02/22/2022     02/22/2022    K 4.1 02/22/2022    CO2 30.3 (H) 02/22/2022     02/22/2022    BUN 17 02/22/2022    CREATININE 0.66 02/22/2022    EGFRIFNONA 93 02/22/2022    BCR 25.8 (H)  02/22/2022    ANIONGAP 8.7 02/22/2022       Patient seen in no apparent distress.      PHYSICAL EXAM:     Foot/Ankle Exam:       General:   Appearance: obesity    Orientation: AAOx3    Affect: appropriate    Gait: unimpaired    Shoe Gear:  Casual shoes    VASCULAR      Right Foot Vascularity   Normal vascular exam    Dorsalis pedis:  2+  Posterior tibial:  2+  Skin Temperature: warm    Edema Grading:  None  CFT:  < 3 seconds  Pedal Hair Growth:  Present  Varicosities: none       Left Foot Vascularity   Normal vascular exam    Dorsalis pedis:  2+  Posterior tibial:  2+  Skin Temperature: warm    Edema Grading:  None  CFT:  < 3 seconds  Pedal Hair Growth:  Present  Varicosities: none        NEUROLOGIC     Right Foot Neurologic   Normal sensation    Light touch sensation:  Normal  Vibratory sensation:  Normal  Hot/Cold sensation: normal    Protective Sensation using [place]-Ave Monofilament:  10     Left Foot Neurologic   Normal sensation    Light touch sensation:  Normal  Vibratory sensation:  Normal  Hot/cold sensation: normal    Protective Sensation using [place]-Ave Monofilament:  10     MUSCULOSKELETAL      Left Foot Musculoskeletal   Tenderness comment:  Plantar left fourth metatarsal head area.  No signs of edema, erythema, lymphangitis, nor signs of infection.       MUSCLE STRENGTH     Right Foot Muscle Strength   Normal strength    Foot dorsiflexion:  5  Foot plantar flexion:  5  Foot inversion:  5  Foot eversion:  5     Left Foot Muscle Strength   Foot dorsiflexion:  5  Foot plantar flexion:  5  Foot inversion:  5  Foot eversion:  5     RANGE OF MOTION      Right Foot Range of Motion   Foot and ankle ROM within normal limits       Left Foot Range of Motion   Foot and ankle ROM within normal limits       DERMATOLOGIC     Right Foot Dermatologic   Skin: skin intact    Nails: normal       Left Foot Dermatologic   Skin: skin intact    Nails: normal        Left Foot Additional Comments: A puncture area  on the plantar left fourth metatarsal head area.  Positive tenderness to palpation. No signs of edema, erythema, lymphangitis, nor signs of infection.  Upon incision with a #15 scalpel blade, a piece of glass was removed in toto from the subcutaneous layer.  The patient states immediate relief of painful symptoms.   A band-aid with Bactroban was applied to the excision area.        Diabetic Foot Exam Performed      ASSESSMENT/PLAN     Diagnoses and all orders for this visit:    1. Foot pain, left (Primary)    2. Foreign body (FB) in soft tissue    3. Diabetic foot (HCC)    4. Non-insulin dependent type 2 diabetes mellitus (HCC)        Comprehensive lower extremity examination and evaluation was performed.    Discussed findings and treatment plan including risks, benefits, and treatment options with patient in detail. Patient agreed with treatment plan.    Medications and allergies reviewed.  Reviewed available blood glucose and HgB A1C lab values along with other pertinent labs.  These were discussed with the patient as to their importance of diabetic maintenance.    Diabetic foot exam performed and documented this date, compliant with CQM required standards. Detail of findings as noted in physical exam.  Lower extremity Neurologic exam for diabetic patient performed and documented this date, compliant with PQRS required standards. Detail of findings as noted in physical exam.  Advised patient importance of good routine lower extremity hygiene. Advised patient importance of evaluating for intact skin and pain free nail borders.  Advised patient to use mirror to evaluate plantar/ soles of feet for better visualization. Advised patient monitor and phone office to be seen if any cracking to skin, open lesions, painful nail borders or if nails become elongated prior to next visit. Advised patient importance of daily cleansing of lower extremities, followed by good skin cream to maintain normal hydration of skin. Also  advised patient importance of close daily monitoring of blood sugar. Advised to regulate diet and medications to maintain control of blood sugar in optimal range. Contact primary care provider if difficulties maintaining blood sugar levels.  Advised Patient of presence of Diabetes Mellitus condition.  Advised Patient risk of progression and worsening or improvement, then return of condition.  Will monitor condition for any change in future. Treat with most appropriate treatment pending status of condition.  Counseled and advised patient extensively on nature and ramifications of diabetes. Standard instructions given to patient for good diabetic foot care and maintenance. Advised importance of careful monitoring to avoid break down and complications secondary to diabetes. Advised patient importance of strict maintenance of blood sugar control. Advised patient of possible ominous results from neglect of condition, i.e.: amputation/ loss of digits, feet and legs, or even death.  Patient states understands counseling, will monitor closely, continue good hygiene and routine diabetic foot care. Patient will contact office is questions or problems.      An After Visit Summary was printed and given to the patient at discharge, including (if requested) any available informative/educational handouts regarding diagnosis, treatment, or medications. All questions were answered to patient/family satisfaction. Should symptoms fail to improve or worsen they agree to call or return to clinic or to go to the Emergency Department. Discussed the importance of following up with any needed screening tests/labs/specialist appointments and any requested follow-up recommended by me today. Importance of maintaining follow-up discussed and patient accepts that missed appointments can delay diagnosis and potentially lead to worsening of conditions.    Return in about 2 weeks (around 4/8/2022) for Recheck & Yearly DM check., or sooner if acute  issues arise.    This document has been electronically signed by Gunnar Weinstein DPM on March 25, 2022 09:44 EDT

## 2022-03-29 ENCOUNTER — OFFICE VISIT (OUTPATIENT)
Dept: PODIATRY | Facility: CLINIC | Age: 57
End: 2022-03-29

## 2022-03-29 VITALS
OXYGEN SATURATION: 99 % | HEART RATE: 70 BPM | BODY MASS INDEX: 39.4 KG/M2 | WEIGHT: 230.8 LBS | TEMPERATURE: 97.3 F | HEIGHT: 64 IN | SYSTOLIC BLOOD PRESSURE: 135 MMHG | DIASTOLIC BLOOD PRESSURE: 65 MMHG

## 2022-03-29 DIAGNOSIS — M79.5 FOREIGN BODY (FB) IN SOFT TISSUE: ICD-10-CM

## 2022-03-29 DIAGNOSIS — E11.8 DIABETIC FOOT: ICD-10-CM

## 2022-03-29 DIAGNOSIS — M79.672 FOOT PAIN, LEFT: Primary | ICD-10-CM

## 2022-03-29 DIAGNOSIS — E11.9 NON-INSULIN DEPENDENT TYPE 2 DIABETES MELLITUS: ICD-10-CM

## 2022-03-29 DIAGNOSIS — B07.0 PLANTAR WARTS: ICD-10-CM

## 2022-03-29 PROCEDURE — 88305 TISSUE EXAM BY PATHOLOGIST: CPT | Performed by: PODIATRIST

## 2022-03-29 PROCEDURE — 99024 POSTOP FOLLOW-UP VISIT: CPT | Performed by: PODIATRIST

## 2022-03-29 PROCEDURE — 28043 EXC FOOT/TOE TUM SC < 1.5 CM: CPT | Performed by: PODIATRIST

## 2022-03-29 NOTE — PROGRESS NOTES
[facility] - PODIATRY    Today's Date: 22    Patient Name: Zahira Ramirez  MRN: [id number]  [facility]: [id number]  PCP: Kelvin Khan APRN, Last PCP Visit: 2022  Referring Provider: No ref. provider found    SUBJECTIVE     Chief Complaint   Patient presents with   • Left Foot - Pain     Per pt feels like glass still in foot     HPI: Zahira Ramirez, a 56 y.o.female, presents to clinic for a chief complaint of pain in her left fourth metatarsal head.    New, Established, New Problem: New problem    Started early 2022: Insidiously    Onset: Insidious    Nature: Sore, sharp    Stable, worsening, improving: Patient states it still feels like something is stuck in her left plantar fourth metatarsal head area    Patient controlling diabetes via: Controlled, NIDDM    Patient denies any fevers, chills, nausea, vomiting, shortness of breath, nor any other constitutional signs nor symptoms.    Past Medical History:   Diagnosis Date   • Anemia    • Broken bones    • Diabetes mellitus (HCC)     Type 2   • Endometriosis    • Forgetfulness    • Heart murmur  this year    Had a big flutter and felt like i was gonna pass out.   • Hyperlipemia    • Hypothyroidism    • Obesity    • Pneumonia Early July   • Stroke (HCC)      Past Surgical History:   Procedure Laterality Date   • ABDOMINAL HYSTERECTOMY     • CARPAL TUNNEL RELEASE     •  SECTION     • ENDOMETRIAL ABLATION     • LASER ABLATION     • OTHER SURGICAL HISTORY      Laparotomy   • TUBAL ABDOMINAL LIGATION       Family History   Problem Relation Age of Onset   • Cervical cancer Mother         Malignant   • Aneurysm Mother    • Diabetes Mother    • Stroke Other    • Heart disease Other    • Uterine cancer Other    • Heart disease Father    • Hypertension Father    • Stroke Paternal Grandmother      Social History     Socioeconomic History   • Marital status:    Tobacco Use   • Smoking status: Former Smoker      Packs/day: 1.00     Types: Cigarettes   • Smokeless tobacco: Never Used   • Tobacco comment: quit at age 42. second hand smoke exposure-never   Vaping Use   • Vaping Use: Never used   Substance and Sexual Activity   • Alcohol use: Yes     Alcohol/week: 1.0 standard drink     Types: 1 Glasses of wine per week     Comment: drinks rarely; wine   • Drug use: Never   • Sexual activity: Yes     Partners: Male     Birth control/protection: None     No Known Allergies  Current Outpatient Medications   Medication Sig Dispense Refill   • Accu-Chek Guide test strip USE TO TEST ONCE DAILY 100 each 1   • cefdinir (OMNICEF) 300 MG capsule Take 1 capsule by mouth 2 (Two) Times a Day. 20 capsule 0   • diclofenac (VOLTAREN) 50 MG EC tablet Take 1 tablet by mouth 2 (Two) Times a Day. 60 tablet 1   • metFORMIN (GLUCOPHAGE) 500 MG tablet Take 1 tablet by mouth 2 (Two) Times a Day With Meals. 90 tablet 1   • Progesterone 100 MG capsule Take 150 mg by mouth Daily. 90 tablet 1   • Thyroid 90 MG PO tablet Take 1 tablet by mouth Daily. 90 tablet 1   • vitamin D (ERGOCALCIFEROL) 1.25 MG (17818 UT) capsule capsule TAKE 1 CAPSULE BY MOUTH 1 TIME WEEKLY 13 capsule 1     No current facility-administered medications for this visit.     Review of Systems   Skin:        Continued pain in plantar left fourth metatarsal head area   All other systems reviewed and are negative.      OBJECTIVE     Vitals:    03/29/22 0819   BP: 135/65   Pulse: 70   Temp: 97.3 °F (36.3 °C)   SpO2: 99%       Body mass index is 39.62 kg/m².    Lab Results   Component Value Date    HGBA1C 5.00 02/22/2022       Lab Results   Component Value Date    GLUCOSE 112 (H) 02/22/2022    CALCIUM 9.3 02/22/2022     02/22/2022    K 4.1 02/22/2022    CO2 30.3 (H) 02/22/2022     02/22/2022    BUN 17 02/22/2022    CREATININE 0.66 02/22/2022    EGFRIFNONA 93 02/22/2022    BCR 25.8 (H) 02/22/2022    ANIONGAP 8.7 02/22/2022       Patient seen in no apparent distress.      PHYSICAL  EXAM:     Foot/Ankle Exam:       General:   Appearance: obesity    Orientation: AAOx3    Affect: appropriate    Gait: unimpaired    Shoe Gear:  Casual shoes    VASCULAR      Right Foot Vascularity   Normal vascular exam    Dorsalis pedis:  2+  Posterior tibial:  2+  Skin Temperature: warm    Edema Grading:  None  CFT:  < 3 seconds  Pedal Hair Growth:  Present  Varicosities: none       Left Foot Vascularity   Normal vascular exam    Dorsalis pedis:  2+  Posterior tibial:  2+  Skin Temperature: warm    Edema Grading:  None  CFT:  < 3 seconds  Pedal Hair Growth:  Present  Varicosities: none        NEUROLOGIC     Right Foot Neurologic   Normal sensation    Light touch sensation:  Normal  Vibratory sensation:  Normal  Hot/Cold sensation: normal    Protective Sensation using [place] Monofilament:  10     Left Foot Neurologic   Normal sensation    Light touch sensation:  Normal  Vibratory sensation:  Normal  Hot/cold sensation: normal    Protective Sensation using [place] Monofilament:  10     MUSCULOSKELETAL      Left Foot Musculoskeletal   Tenderness comment:  Plantar left fourth metatarsal head area.  No signs of edema, erythema, lymphangitis, nor signs of infection.       MUSCLE STRENGTH     Right Foot Muscle Strength   Normal strength    Foot dorsiflexion:  5  Foot plantar flexion:  5  Foot inversion:  5  Foot eversion:  5     Left Foot Muscle Strength   Foot dorsiflexion:  5  Foot plantar flexion:  5  Foot inversion:  5  Foot eversion:  5     RANGE OF MOTION      Right Foot Range of Motion   Foot and ankle ROM within normal limits       Left Foot Range of Motion   Foot and ankle ROM within normal limits       DERMATOLOGIC     Right Foot Dermatologic   Skin: skin intact    Nails: normal       Left Foot Dermatologic   Skin: skin intact    Nails: normal        Left Foot Additional Comments: Local anesthesia was obtained around the soft tissue lesion(s) in the left plantar forefoot utilizing a total of  3mL of 1% lidocaine plain.      The foot was then scrubbed, prepped and draped in the usual aseptic manner.       Attention was directed to the plantar left foot where 1 individual soft tissue lesion were visualized.      With use of a #15 scalpel blade, incision was made circumferential around each individual lesion and deepened as to allow complete excision of each individual lesion.    This/these lesion(s) measured:  4mm x 4mm x 5mm.    These/this lesion(s) are sent for pathologic identification.    Each excision site was then treated wtih 3 x 30 second applications of 89% phenol.    Care was taken to identify and retract all vital and neurovascular structures.  All bleeders were ligated and cauterized as necessary.    The surrounding excised borders were then smoothed with a #15 scalpel blade.    These areas were irrigated with copious amounts of isopropyl alcohol.      Diabetic Foot Exam Performed      ASSESSMENT/PLAN     Diagnoses and all orders for this visit:    1. Foot pain, left (Primary)    2. Foreign body (FB) in soft tissue    3. Diabetic foot (HCC)    4. Non-insulin dependent type 2 diabetes mellitus (HCC)    5. Plantar warts  -     Tissue Pathology Exam; Future        Comprehensive lower extremity examination and evaluation was performed.    Discussed findings and treatment plan including risks, benefits, and treatment options with patient in detail. Patient agreed with treatment plan.    Medications and allergies reviewed.  Reviewed available blood glucose and HgB A1C lab values along with other pertinent labs.  These were discussed with the patient as to their importance of diabetic maintenance.    Diabetic foot exam performed and documented this date, compliant with CQM required standards. Detail of findings as noted in physical exam.  Lower extremity Neurologic exam for diabetic patient performed and documented this date, compliant with PQRS required standards. Detail of findings as noted in  physical exam.  Advised patient importance of good routine lower extremity hygiene. Advised patient importance of evaluating for intact skin and pain free nail borders.  Advised patient to use mirror to evaluate plantar/ soles of feet for better visualization. Advised patient monitor and phone office to be seen if any cracking to skin, open lesions, painful nail borders or if nails become elongated prior to next visit. Advised patient importance of daily cleansing of lower extremities, followed by good skin cream to maintain normal hydration of skin. Also advised patient importance of close daily monitoring of blood sugar. Advised to regulate diet and medications to maintain control of blood sugar in optimal range. Contact primary care provider if difficulties maintaining blood sugar levels.  Advised Patient of presence of Diabetes Mellitus condition.  Advised Patient risk of progression and worsening or improvement, then return of condition.  Will monitor condition for any change in future. Treat with most appropriate treatment pending status of condition.  Counseled and advised patient extensively on nature and ramifications of diabetes. Standard instructions given to patient for good diabetic foot care and maintenance. Advised importance of careful monitoring to avoid break down and complications secondary to diabetes. Advised patient importance of strict maintenance of blood sugar control. Advised patient of possible ominous results from neglect of condition, i.e.: amputation/ loss of digits, feet and legs, or even death.  Patient states understands counseling, will monitor closely, continue good hygiene and routine diabetic foot care. Patient will contact office is questions or problems.      An After Visit Summary was printed and given to the patient at discharge, including (if requested) any available informative/educational handouts regarding diagnosis, treatment, or medications. All questions were answered  to patient/family satisfaction. Should symptoms fail to improve or worsen they agree to call or return to clinic or to go to the Emergency Department. Discussed the importance of following up with any needed screening tests/labs/specialist appointments and any requested follow-up recommended by me today. Importance of maintaining follow-up discussed and patient accepts that missed appointments can delay diagnosis and potentially lead to worsening of conditions.    Return in about 2 weeks (around 4/12/2022) for Plantar Verruca excision f/u., or sooner if acute issues arise.    This document has been electronically signed by Gunnar Weinstein DPM on March 29, 2022 09:12 EDT

## 2022-03-30 RX ORDER — ERGOCALCIFEROL 1.25 MG/1
CAPSULE ORAL
Qty: 13 CAPSULE | Refills: 1 | Status: SHIPPED | OUTPATIENT
Start: 2022-03-30 | End: 2023-01-30

## 2022-03-31 LAB
CYTO UR: NORMAL
LAB AP CASE REPORT: NORMAL
LAB AP CLINICAL INFORMATION: NORMAL
PATH REPORT.FINAL DX SPEC: NORMAL
PATH REPORT.GROSS SPEC: NORMAL

## 2022-04-08 PROBLEM — R00.2 PALPITATIONS: Status: ACTIVE | Noted: 2022-04-08

## 2022-04-08 NOTE — PROGRESS NOTES
Chief Complaint  Palpitations (PCP made patient wear a Holter Monitor, wants to get checked out. )      History of Present Illness  Zahira Ramirez presents to [facility] CARDIOLOGY  Patient is a 56-year-old female with a prior history of dyslipidemia who has been having intermittent heart fluttering for the last several months sometimes associated with dizziness lasting for several seconds.  Patient does report some shortness of breath mildly with the fluttering of her heart denies any lower extremity edema PND orthopnea and no syncope.  She has awoken at night a few times with elevated heart rate.    Past Medical History:   Diagnosis Date   • Anemia    • Broken bones    • Diabetes mellitus (HCC)     Type 2   • Endometriosis    • Forgetfulness    • Heart murmur June this year    Had a big flutter and felt like i was gonna pass out.   • Hyperlipemia    • Hypothyroidism    • Obesity    • Pneumonia Early July   • Stroke (HCC)          Current Outpatient Medications:   •  Accu-Chek Guide test strip, USE TO TEST ONCE DAILY, Disp: 100 each, Rfl: 1  •  metFORMIN (GLUCOPHAGE) 500 MG tablet, Take 1 tablet by mouth 2 (Two) Times a Day With Meals., Disp: 90 tablet, Rfl: 1  •  Progesterone 100 MG capsule, Take 150 mg by mouth Daily., Disp: 90 tablet, Rfl: 1  •  Thyroid 90 MG PO tablet, Take 1 tablet by mouth Daily., Disp: 90 tablet, Rfl: 1  •  vitamin D (ERGOCALCIFEROL) 1.25 MG (23745 UT) capsule capsule, TAKE 1 CAPSULE BY MOUTH 1 TIME WEEKLY, Disp: 13 capsule, Rfl: 1  •  metoprolol succinate XL (TOPROL-XL) 25 MG 24 hr tablet, Take 1 tablet by mouth Daily., Disp: 90 tablet, Rfl: 3    Medications Discontinued During This Encounter   Medication Reason   • diclofenac (VOLTAREN) 50 MG EC tablet    • cefdinir (OMNICEF) 300 MG capsule      No Known Allergies     Social History     Tobacco Use   • Smoking status: Former Smoker     Packs/day: 1.00     Types: Cigarettes   • Smokeless tobacco: Never Used   •  "Tobacco comment: quit at age 42. second hand smoke exposure-never   Vaping Use   • Vaping Use: Never used   Substance Use Topics   • Alcohol use: Yes     Alcohol/week: 1.0 standard drink     Types: 1 Glasses of wine per week     Comment: drinks rarely; wine   • Drug use: Never       Family History   Problem Relation Age of Onset   • Cervical cancer Mother         Malignant   • Aneurysm Mother    • Diabetes Mother    • Stroke Other    • Heart disease Other    • Uterine cancer Other    • Heart disease Father    • Hypertension Father    • Stroke Paternal Grandmother         Objective     /61 (BP Location: Right arm, Patient Position: Sitting, Cuff Size: Small Adult)   Pulse 77   Ht 160 cm (63\")   Wt 102 kg (224 lb 3.2 oz)   BMI 39.72 kg/m²       Physical Exam    General Appearance:   · no acute distress  · Alert and oriented x3  HENT:   · lips not cyanotic  · Atraumatic  Neck:  · No jvd   · Supple  Respiratory:  · no respiratory distress  · normal breath sounds  · no rales  Cardiovascular:  · Regular rate and rhythm  · no S3, no S4   · no murmur  · no rub  Extremities  · No cyanosis  · lower extremity edema: none    Skin:   · warm, dry  · No rashes    Result Review :     proBNP   Date Value Ref Range Status   12/04/2021 26.6 0.0 - 900.0 pg/mL Final     CMP    CMP 12/4/21 2/22/22   Glucose 105 (A) 112 (A)   BUN 9 17   Creatinine 0.71 0.66   eGFR Non African Am 86 93   Sodium 140 142   Potassium 4.3 4.1   Chloride 105 103   Calcium 9.1 9.3   Albumin 4.20 4.20   Total Bilirubin 0.3 0.2   Alkaline Phosphatase 65 64   AST (SGOT) 33 (A) 14   ALT (SGPT) 39 (A) 20   (A) Abnormal value            CBC w/diff    CBC w/Diff 12/4/21 2/22/22   WBC 3.94 5.78   RBC 4.52 4.52   Hemoglobin 12.8 12.6   Hematocrit 38.8 37.5   MCV 85.8 83.0   MCH 28.3 27.9   MCHC 33.0 33.6   RDW 13.2 12.9   Platelets 194 282   Neutrophil Rel % 66.7 62.1   Immature Granulocyte Rel % 0.5 0.2   Lymphocyte Rel % 24.1 26.0   Monocyte Rel % 7.4 8.1 "   Eosinophil Rel % 1.0 2.6   Basophil Rel % 0.3 1.0            Lab Results   Component Value Date    TSH 1.940 02/22/2022      Lab Results   Component Value Date    FREET4 0.90 (L) 02/22/2022      No results found for: DDIMERQUANT  No results found for: MG   No results found for: DIGOXIN   Lab Results   Component Value Date    TROPONINT <0.010 12/04/2021      No results found for: POCTROP(       Lipid Panel    Lipid Panel 2/22/22   Total Cholesterol 203 (A)   Triglycerides 150   HDL Cholesterol 40   VLDL Cholesterol 27   LDL Cholesterol  136 (A)   (A) Abnormal value              Event monitor 3/22  Baseline rhythm is sinus with an average heart rate of 74 bpm, lowest heart rate of 57 bpm and maximum heart rate of 106 bpm.  No pauses exceeding 3 seconds were noted.  Rare PACs with short atrial runs were noted, more likely PAT.  Rare PVCs were noted.  No ventricular tachycardias were noted.  Reported events seem to correlate with sinus rhythm with atrial run/ectopy              No results found for this or any previous visit.             The ASCVD Risk score (Alden DC Jr., et al., 2013) failed to calculate for the following reasons:    The patient has a prior MI or stroke diagnosis     Diagnoses and all orders for this visit:    1. PSVT (paroxysmal supraventricular tachycardia) (HCC) (Primary)  Assessment & Plan:  Patient with evidence of SVT likely an ectopic atrial tachycardia underlying cause for her symptoms recommend a trial of magnesium 500 mg once a day as well as given her a test dose of Toprol 25 mg to use initially on as-needed basis would recommend avoidance of any stimulants.  Check echocardiogram for any structural heart issues    Orders:  -     Adult Transthoracic Echo Complete W/ Cont if Necessary Per Protocol; Future    2. Palpitations  Assessment & Plan:  Symptoms are likely related to PAT and/or PVCs/PACs if no improvement magnesium patient is to try beta-blocker as needed for symptoms reassured  patient of the benign nature      Other orders  -     metoprolol succinate XL (TOPROL-XL) 25 MG 24 hr tablet; Take 1 tablet by mouth Daily.  Dispense: 90 tablet; Refill: 3          Follow Up     Return in about 6 months (around 10/14/2022) for Follow with Herlinda Rosenbaum.          Patient was given instructions and counseling regarding her condition or for health maintenance advice. Please see specific information pulled into the AVS if appropriate.

## 2022-04-11 LAB
MAXIMAL PREDICTED HEART RATE: 166 BPM
STRESS TARGET HR: 141 BPM

## 2022-04-11 PROCEDURE — 93272 ECG/REVIEW INTERPRET ONLY: CPT | Performed by: INTERNAL MEDICINE

## 2022-04-14 ENCOUNTER — OFFICE VISIT (OUTPATIENT)
Dept: CARDIOLOGY | Facility: CLINIC | Age: 57
End: 2022-04-14

## 2022-04-14 VITALS
HEART RATE: 77 BPM | HEIGHT: 63 IN | DIASTOLIC BLOOD PRESSURE: 61 MMHG | WEIGHT: 224.2 LBS | BODY MASS INDEX: 39.73 KG/M2 | SYSTOLIC BLOOD PRESSURE: 133 MMHG

## 2022-04-14 DIAGNOSIS — R00.2 PALPITATIONS: ICD-10-CM

## 2022-04-14 DIAGNOSIS — I47.1 PSVT (PAROXYSMAL SUPRAVENTRICULAR TACHYCARDIA): Primary | ICD-10-CM

## 2022-04-14 PROBLEM — I47.19 PAT (PAROXYSMAL ATRIAL TACHYCARDIA): Status: ACTIVE | Noted: 2022-04-14

## 2022-04-14 PROBLEM — I47.10 PSVT (PAROXYSMAL SUPRAVENTRICULAR TACHYCARDIA): Status: ACTIVE | Noted: 2022-04-14

## 2022-04-14 PROCEDURE — 99204 OFFICE O/P NEW MOD 45 MIN: CPT | Performed by: INTERNAL MEDICINE

## 2022-04-14 RX ORDER — METOPROLOL SUCCINATE 25 MG/1
25 TABLET, EXTENDED RELEASE ORAL DAILY
Qty: 90 TABLET | Refills: 3 | Status: SHIPPED | OUTPATIENT
Start: 2022-04-14 | End: 2022-08-18

## 2022-04-14 NOTE — ASSESSMENT & PLAN NOTE
Symptoms are likely related to PAT and/or PVCs/PACs if no improvement magnesium patient is to try beta-blocker as needed for symptoms reassured patient of the benign nature

## 2022-04-14 NOTE — ASSESSMENT & PLAN NOTE
Patient with evidence of SVT likely an ectopic atrial tachycardia underlying cause for her symptoms recommend a trial of magnesium 500 mg once a day as well as given her a test dose of Toprol 25 mg to use initially on as-needed basis would recommend avoidance of any stimulants.  Check echocardiogram for any structural heart issues

## 2022-04-20 ENCOUNTER — PATIENT MESSAGE (OUTPATIENT)
Dept: FAMILY MEDICINE CLINIC | Facility: CLINIC | Age: 57
End: 2022-04-20

## 2022-04-20 NOTE — TELEPHONE ENCOUNTER
From: Zahira Ramirez  To: PEYTON Cruz  Sent: 4/20/2022 4:04 PM EDT  Subject: Question regarding T4 FREE    My T4 is low. May be why I have hair fall constantly. How do we bring this up?

## 2022-05-03 ENCOUNTER — OFFICE VISIT (OUTPATIENT)
Dept: SURGERY | Facility: CLINIC | Age: 57
End: 2022-05-03

## 2022-05-03 ENCOUNTER — PREP FOR SURGERY (OUTPATIENT)
Dept: OTHER | Facility: HOSPITAL | Age: 57
End: 2022-05-03

## 2022-05-03 VITALS — WEIGHT: 228.6 LBS | HEIGHT: 64 IN | BODY MASS INDEX: 39.03 KG/M2

## 2022-05-03 DIAGNOSIS — Z12.11 SCREENING FOR MALIGNANT NEOPLASM OF COLON: Primary | ICD-10-CM

## 2022-05-03 PROCEDURE — S0285 CNSLT BEFORE SCREEN COLONOSC: HCPCS | Performed by: NURSE PRACTITIONER

## 2022-05-03 RX ORDER — SODIUM CHLORIDE 0.9 % (FLUSH) 0.9 %
10 SYRINGE (ML) INJECTION AS NEEDED
Status: CANCELLED | OUTPATIENT
Start: 2022-05-03

## 2022-05-03 RX ORDER — POLYETHYLENE GLYCOL 3350 17 G/17G
POWDER, FOR SOLUTION ORAL
Qty: 238 PACKET | Refills: 0 | Status: SHIPPED | OUTPATIENT
Start: 2022-05-03 | End: 2022-08-18

## 2022-05-03 RX ORDER — SODIUM CHLORIDE 0.9 % (FLUSH) 0.9 %
3 SYRINGE (ML) INJECTION EVERY 12 HOURS SCHEDULED
Status: CANCELLED | OUTPATIENT
Start: 2022-05-03

## 2022-05-03 NOTE — PROGRESS NOTES
Chief Complaint: Colonoscopy (No complaints.)    Subjective      Colonoscopy consultation       History of Present Illness  Zahira Ramirez is a 56 y.o. female presents to [facility] GENERAL SURGERY for colonoscopy consultation.    Patient presents today on referral from eKlvin Ramsay for colonoscopy consultation.    Patient denies any abdominal pain, change in bowel habit, rectal bleeding.    Denies any family history of colorectal cancer.    No previous colonoscopy.    Patient reports that she had a previous TIA during giving birth years ago.    Objective     Past Medical History:   Diagnosis Date   • Anemia    • Broken bones    • Diabetes mellitus (HCC)     Type 2   • Endometriosis    • Forgetfulness    • Heart murmur  this year    Had a big flutter and felt like i was gonna pass out.   • Hyperlipemia    • Hypothyroidism    • Obesity    • Pneumonia Early July   • Stroke (HCC)        Past Surgical History:   Procedure Laterality Date   • ABDOMINAL HYSTERECTOMY     • CARPAL TUNNEL RELEASE     •  SECTION     • ENDOMETRIAL ABLATION     • LASER ABLATION     • OTHER SURGICAL HISTORY      Laparotomy   • TUBAL ABDOMINAL LIGATION         Outpatient Medications Marked as Taking for the 5/3/22 encounter (Office Visit) with Tu    Medication Sig Dispense Refill   • Accu-Chek Guide test strip USE TO TEST ONCE DAILY 100 each 1   • metFORMIN (GLUCOPHAGE) 500 MG tablet Take 1 tablet by mouth 2 (Two) Times a Day With Meals. 90 tablet 1   • Progesterone 100 MG capsule Take 150 mg by mouth Daily. 90 tablet 1   • Thyroid 90 MG PO tablet Take 1 tablet by mouth Daily. 90 tablet 1   • vitamin D (ERGOCALCIFEROL) 1.25 MG (45331 UT) capsule capsule TAKE 1 CAPSULE BY MOUTH 1 TIME WEEKLY 13 capsule 1       No Known Allergies     Family History   Problem Relation Age of Onset   • Cervical cancer Mother         Malignant   • Aneurysm Mother    • Diabetes Mother    • Stroke Other    •  "Heart disease Other    • Uterine cancer Other    • Heart disease Father    • Hypertension Father    • Stroke Paternal Grandmother        Social History     Socioeconomic History   • Marital status:    Tobacco Use   • Smoking status: Former Smoker     Packs/day: 1.00     Types: Cigarettes   • Smokeless tobacco: Never Used   • Tobacco comment: quit at age 42. second hand smoke exposure-never   Vaping Use   • Vaping Use: Never used   Substance and Sexual Activity   • Alcohol use: Yes     Alcohol/week: 1.0 standard drink     Types: 1 Glasses of wine per week     Comment: drinks rarely; wine   • Drug use: Never   • Sexual activity: Yes     Partners: Male     Birth control/protection: None       Review of Systems   Constitutional: Negative for chills and fever.   Gastrointestinal: Negative for abdominal distention, abdominal pain, anal bleeding, blood in stool, constipation, diarrhea and rectal pain.        Vital Signs:   Ht 161.3 cm (63.5\")   Wt 104 kg (228 lb 9.6 oz)   BMI 39.86 kg/m²      Physical Exam  Constitutional:       Appearance: She is obese.   HENT:      Head: Normocephalic.   Cardiovascular:      Rate and Rhythm: Normal rate.   Pulmonary:      Effort: Pulmonary effort is normal.   Abdominal:      Palpations: Abdomen is soft.   Skin:     General: Skin is warm and dry.   Neurological:      General: No focal deficit present.      Mental Status: She is alert and oriented to person, place, and time.   Psychiatric:         Mood and Affect: Mood normal.         Thought Content: Thought content normal.          Result Review :          []  Laboratory  []  Radiology  []  Pathology  []  Microbiology  []  EKG/Telemetry   []  Cardiology/Vascular   [x]  Old records  Today I reviewed Kelvin Ramsay's previous office note.        Assessment and Plan    Diagnoses and all orders for this visit:    1. Screening for malignant neoplasm of colon (Primary)    Other orders  -     polyethylene glycol (MIRALAX) 17 g " packet; Take as directed.  Instructions given in office.  Dispense: 238 g bottle  Dispense: 238 packet; Refill: 0    white prep    Follow Up   Return for Scheduled colonoscopy with Dr. Zambrano on 7/22/2022 at [facility]  .     Hospital arrival time: 0900.    Possible risks/complications, benefits, and alternatives to surgical or invasive procedure have been explained to patient and/or legal guardian.     Patient has been evaluated and can tolerate anesthesia and/or sedation. Risks, benefits, and alternatives to anesthesia and sedation have been explained to patient and/or legal guardian.  Patient verbalizes understanding is will proceed with above plan.    Patient was given instructions and counseling regarding her condition or for health maintenance advice. Please see specific information pulled into the AVS if appropriate.

## 2022-05-18 DIAGNOSIS — E11.65 TYPE 2 DIABETES MELLITUS WITH HYPERGLYCEMIA, WITHOUT LONG-TERM CURRENT USE OF INSULIN: ICD-10-CM

## 2022-07-15 ENCOUNTER — TELEPHONE (OUTPATIENT)
Dept: SURGERY | Facility: CLINIC | Age: 57
End: 2022-07-15

## 2022-07-15 ENCOUNTER — TELEPHONE (OUTPATIENT)
Dept: FAMILY MEDICINE CLINIC | Facility: CLINIC | Age: 57
End: 2022-07-15

## 2022-07-15 NOTE — TELEPHONE ENCOUNTER
Caller: Zahira Ramirez    Relationship to patient: Self    Best call back number: [phone]    Patient is needing: PT CALLED TO CANCEL PROCEDURE W/ DR VELIZ ON 7/22. PT ADVISED SHE IS HAVING SECOND THOUGHTS ABOUT THE PROCEDURE AND WILL CALL BACK WHEN SHE IS READY TO RESCHEDULE    ATTEMPTED TO TRANSFER; NO ANSWER AT PRACTICE

## 2022-07-15 NOTE — TELEPHONE ENCOUNTER
Spoke to adilia belle in scheduling and cx'd procedure. Spoke to Ariana Khan's office and notified them patient CX'D colonoscopy.

## 2022-07-15 NOTE — TELEPHONE ENCOUNTER
Caller: [facility]    Relationship to patient:     Best call back number: [phone]    Patient is needing: [facility] CALLED TO UPDATE THAT THE PATIENT HAS CANCELLED HER COLONOSCOPY.

## 2022-07-25 DIAGNOSIS — Z79.890 HORMONE REPLACEMENT THERAPY: ICD-10-CM

## 2022-07-25 RX ORDER — LEVOTHYROXINE, LIOTHYRONINE 57; 13.5 UG/1; UG/1
TABLET ORAL
Qty: 90 TABLET | Refills: 1 | Status: SHIPPED | OUTPATIENT
Start: 2022-07-25 | End: 2022-08-18 | Stop reason: SDUPTHER

## 2022-07-25 RX ORDER — PROGESTERONE 100 MG/1
100 CAPSULE ORAL DAILY
Qty: 90 CAPSULE | Refills: 1
Start: 2022-07-25 | End: 2023-02-17

## 2022-07-25 NOTE — TELEPHONE ENCOUNTER
This is a compound medication. Progesterone 150mg daily with [facility]. Medication was called in verbally and given to the pharmacist.

## 2022-08-16 ENCOUNTER — LAB (OUTPATIENT)
Dept: LAB | Facility: HOSPITAL | Age: 57
End: 2022-08-16

## 2022-08-16 DIAGNOSIS — R68.82 DECREASED LIBIDO: ICD-10-CM

## 2022-08-16 DIAGNOSIS — Z79.890 HORMONE REPLACEMENT THERAPY: ICD-10-CM

## 2022-08-16 DIAGNOSIS — E11.65 TYPE 2 DIABETES MELLITUS WITH HYPERGLYCEMIA, WITHOUT LONG-TERM CURRENT USE OF INSULIN: ICD-10-CM

## 2022-08-16 DIAGNOSIS — E03.9 ACQUIRED HYPOTHYROIDISM: ICD-10-CM

## 2022-08-16 LAB
ALBUMIN SERPL-MCNC: 4.7 G/DL (ref 3.5–5.2)
ALBUMIN/GLOB SERPL: 1.7 G/DL
ALP SERPL-CCNC: 82 U/L (ref 39–117)
ALT SERPL W P-5'-P-CCNC: 24 U/L (ref 1–33)
ANION GAP SERPL CALCULATED.3IONS-SCNC: 10.4 MMOL/L (ref 5–15)
AST SERPL-CCNC: 19 U/L (ref 1–32)
BASOPHILS # BLD AUTO: 0.06 10*3/MM3 (ref 0–0.2)
BASOPHILS NFR BLD AUTO: 1 % (ref 0–1.5)
BILIRUB SERPL-MCNC: 0.4 MG/DL (ref 0–1.2)
BILIRUB UR QL STRIP: NEGATIVE
BUN SERPL-MCNC: 15 MG/DL (ref 6–20)
BUN/CREAT SERPL: 20.3 (ref 7–25)
CALCIUM SPEC-SCNC: 9.3 MG/DL (ref 8.6–10.5)
CHLORIDE SERPL-SCNC: 99 MMOL/L (ref 98–107)
CHOLEST SERPL-MCNC: 187 MG/DL (ref 0–200)
CLARITY UR: CLEAR
CO2 SERPL-SCNC: 27.6 MMOL/L (ref 22–29)
COLOR UR: YELLOW
CREAT SERPL-MCNC: 0.74 MG/DL (ref 0.57–1)
DEPRECATED RDW RBC AUTO: 39.8 FL (ref 37–54)
EGFRCR SERPLBLD CKD-EPI 2021: 96.3 ML/MIN/1.73
EOSINOPHIL # BLD AUTO: 0.16 10*3/MM3 (ref 0–0.4)
EOSINOPHIL NFR BLD AUTO: 2.7 % (ref 0.3–6.2)
ERYTHROCYTE [DISTWIDTH] IN BLOOD BY AUTOMATED COUNT: 13.1 % (ref 12.3–15.4)
GLOBULIN UR ELPH-MCNC: 2.7 GM/DL
GLUCOSE SERPL-MCNC: 114 MG/DL (ref 65–99)
GLUCOSE UR STRIP-MCNC: NEGATIVE MG/DL
HBA1C MFR BLD: 5.6 % (ref 4.8–5.6)
HCT VFR BLD AUTO: 41.3 % (ref 34–46.6)
HDLC SERPL QL: 4.68
HDLC SERPL-MCNC: 40 MG/DL (ref 40–60)
HGB BLD-MCNC: 13.9 G/DL (ref 12–15.9)
HGB UR QL STRIP.AUTO: NEGATIVE
IMM GRANULOCYTES # BLD AUTO: 0.02 10*3/MM3 (ref 0–0.05)
IMM GRANULOCYTES NFR BLD AUTO: 0.3 % (ref 0–0.5)
KETONES UR QL STRIP: NEGATIVE
LDLC SERPL CALC-MCNC: 106 MG/DL (ref 0–100)
LEUKOCYTE ESTERASE UR QL STRIP.AUTO: NEGATIVE
LYMPHOCYTES # BLD AUTO: 1.65 10*3/MM3 (ref 0.7–3.1)
LYMPHOCYTES NFR BLD AUTO: 27.7 % (ref 19.6–45.3)
MCH RBC QN AUTO: 28.3 PG (ref 26.6–33)
MCHC RBC AUTO-ENTMCNC: 33.7 G/DL (ref 31.5–35.7)
MCV RBC AUTO: 84.1 FL (ref 79–97)
MONOCYTES # BLD AUTO: 0.49 10*3/MM3 (ref 0.1–0.9)
MONOCYTES NFR BLD AUTO: 8.2 % (ref 5–12)
NEUTROPHILS NFR BLD AUTO: 3.58 10*3/MM3 (ref 1.7–7)
NEUTROPHILS NFR BLD AUTO: 60.1 % (ref 42.7–76)
NITRITE UR QL STRIP: NEGATIVE
NRBC BLD AUTO-RTO: 0 /100 WBC (ref 0–0.2)
PH UR STRIP.AUTO: 6.5 [PH] (ref 5–8)
PLATELET # BLD AUTO: 286 10*3/MM3 (ref 140–450)
PMV BLD AUTO: 10 FL (ref 6–12)
POTASSIUM SERPL-SCNC: 4.1 MMOL/L (ref 3.5–5.2)
PROGEST SERPL-MCNC: 1.28 NG/ML
PROT SERPL-MCNC: 7.4 G/DL (ref 6–8.5)
PROT UR QL STRIP: NEGATIVE
RBC # BLD AUTO: 4.91 10*6/MM3 (ref 3.77–5.28)
SODIUM SERPL-SCNC: 137 MMOL/L (ref 136–145)
SP GR UR STRIP: 1.02 (ref 1–1.03)
T3FREE SERPL-MCNC: 4.32 PG/ML (ref 2–4.4)
T4 FREE SERPL-MCNC: 1 NG/DL (ref 0.93–1.7)
TRIGL SERPL-MCNC: 236 MG/DL (ref 0–150)
TSH SERPL DL<=0.05 MIU/L-ACNC: 1.46 UIU/ML (ref 0.27–4.2)
UROBILINOGEN UR QL STRIP: NORMAL
VLDLC SERPL-MCNC: 41 MG/DL (ref 5–40)
WBC NRBC COR # BLD: 5.96 10*3/MM3 (ref 3.4–10.8)

## 2022-08-16 PROCEDURE — 84144 ASSAY OF PROGESTERONE: CPT

## 2022-08-16 PROCEDURE — 83036 HEMOGLOBIN GLYCOSYLATED A1C: CPT

## 2022-08-16 PROCEDURE — 85025 COMPLETE CBC W/AUTO DIFF WBC: CPT

## 2022-08-16 PROCEDURE — 84443 ASSAY THYROID STIM HORMONE: CPT

## 2022-08-16 PROCEDURE — 84439 ASSAY OF FREE THYROXINE: CPT

## 2022-08-16 PROCEDURE — 83002 ASSAY OF GONADOTROPIN (LH): CPT

## 2022-08-16 PROCEDURE — 81003 URINALYSIS AUTO W/O SCOPE: CPT

## 2022-08-16 PROCEDURE — 80061 LIPID PANEL: CPT

## 2022-08-16 PROCEDURE — 84481 FREE ASSAY (FT-3): CPT

## 2022-08-16 PROCEDURE — 36415 COLL VENOUS BLD VENIPUNCTURE: CPT

## 2022-08-16 PROCEDURE — 80053 COMPREHEN METABOLIC PANEL: CPT

## 2022-08-16 PROCEDURE — 83001 ASSAY OF GONADOTROPIN (FSH): CPT

## 2022-08-18 ENCOUNTER — PATIENT MESSAGE (OUTPATIENT)
Dept: FAMILY MEDICINE CLINIC | Facility: CLINIC | Age: 57
End: 2022-08-18

## 2022-08-18 ENCOUNTER — OFFICE VISIT (OUTPATIENT)
Dept: FAMILY MEDICINE CLINIC | Facility: CLINIC | Age: 57
End: 2022-08-18

## 2022-08-18 ENCOUNTER — TELEPHONE (OUTPATIENT)
Dept: FAMILY MEDICINE CLINIC | Facility: CLINIC | Age: 57
End: 2022-08-18

## 2022-08-18 VITALS
RESPIRATION RATE: 16 BRPM | TEMPERATURE: 98 F | HEART RATE: 98 BPM | WEIGHT: 228.4 LBS | OXYGEN SATURATION: 98 % | SYSTOLIC BLOOD PRESSURE: 130 MMHG | BODY MASS INDEX: 38.99 KG/M2 | HEIGHT: 64 IN | DIASTOLIC BLOOD PRESSURE: 64 MMHG

## 2022-08-18 DIAGNOSIS — E66.09 CLASS 2 OBESITY DUE TO EXCESS CALORIES WITH BODY MASS INDEX (BMI) OF 39.0 TO 39.9 IN ADULT, UNSPECIFIED WHETHER SERIOUS COMORBIDITY PRESENT: ICD-10-CM

## 2022-08-18 DIAGNOSIS — E66.09 CLASS 2 OBESITY DUE TO EXCESS CALORIES WITH BODY MASS INDEX (BMI) OF 39.0 TO 39.9 IN ADULT, UNSPECIFIED WHETHER SERIOUS COMORBIDITY PRESENT: Primary | ICD-10-CM

## 2022-08-18 DIAGNOSIS — R68.82 DECREASED LIBIDO: ICD-10-CM

## 2022-08-18 DIAGNOSIS — E03.9 ACQUIRED HYPOTHYROIDISM: ICD-10-CM

## 2022-08-18 DIAGNOSIS — E11.9 TYPE 2 DIABETES MELLITUS WITHOUT COMPLICATION, WITHOUT LONG-TERM CURRENT USE OF INSULIN: ICD-10-CM

## 2022-08-18 DIAGNOSIS — Z12.31 ENCOUNTER FOR SCREENING MAMMOGRAM FOR MALIGNANT NEOPLASM OF BREAST: ICD-10-CM

## 2022-08-18 DIAGNOSIS — Z12.11 SCREEN FOR COLON CANCER: Primary | ICD-10-CM

## 2022-08-18 DIAGNOSIS — E78.2 MIXED HYPERLIPIDEMIA: ICD-10-CM

## 2022-08-18 LAB

## 2022-08-18 PROCEDURE — 80305 DRUG TEST PRSMV DIR OPT OBS: CPT | Performed by: NURSE PRACTITIONER

## 2022-08-18 PROCEDURE — 99214 OFFICE O/P EST MOD 30 MIN: CPT | Performed by: NURSE PRACTITIONER

## 2022-08-18 RX ORDER — LEVOTHYROXINE AND LIOTHYRONINE 38; 9 UG/1; UG/1
60 TABLET ORAL DAILY
Qty: 90 TABLET | Refills: 1 | Status: SHIPPED | OUTPATIENT
Start: 2022-08-18 | End: 2023-01-30

## 2022-08-18 RX ORDER — ESTRADIOL 0.03 MG/D
1 FILM, EXTENDED RELEASE TRANSDERMAL 2 TIMES WEEKLY
Qty: 8 PATCH | Refills: 5 | Status: SHIPPED | OUTPATIENT
Start: 2022-08-18 | End: 2022-09-12

## 2022-08-18 RX ORDER — PHENTERMINE HYDROCHLORIDE 37.5 MG/1
37.5 CAPSULE ORAL EVERY MORNING
Qty: 30 CAPSULE | Refills: 0 | Status: SHIPPED | OUTPATIENT
Start: 2022-08-18 | End: 2023-02-17

## 2022-08-18 NOTE — TELEPHONE ENCOUNTER
From: Zahira Ramirez  To: PEYTON Cruz  Sent: 8/18/2022 12:25 PM EDT  Subject: Phentermine    [facility] has my Thyroid prescription but not the phentermine.

## 2022-08-18 NOTE — PROGRESS NOTES
Answers for HPI/ROS submitted by the patient on 2022  Please describe your symptoms.: 6 month follow up- ( had labs done 22) type 2 diabetes, hypothyroid. , Want to go back to 60 mg natural or armour thyroid I'm thinking i was over medicated by the other doctor . , May need estrogen supplement.  Have you had these symptoms before?: Yes  How long have you been having these symptoms?: Greater than 2 weeks  Please list any medications you are currently taking for this condition.: Thyroid 90mg, Metformin, Progesterone, multivitamin  What is the primary reason for your visit?: Other    Chief Complaint  Diabetes, Hypothyroidism (Was cutting her medication in half due to palpitations, doesn't have any but now very fatigued.), Fatigue, and low libido    Subjective        Zahira Ramirez presents to [facility]  Diabetes:  Had been going to a wellness clinic and has found that the horones have worked well but the berberine didn't manage h er blood sugars.    Hormone replacement therapy doing well on progesterone.    Would like to restart phentermine for a month.  Understands risk of PPH.  Need to lose 4 pounds a month.    Hypothyroid:  States still having issues with thyroid and having palpitations.  Started taking 1/2 tab and is not having palpitations and extreme fatigue.        Past History:    Medical History: has a past medical history of Anemia, Broken bones, Diabetes mellitus (HCC), Endometriosis, Forgetfulness, Heart murmur ( this year), Hyperlipemia, Hypothyroidism, Obesity, Pneumonia (Early July), and Stroke ([facility]).     Surgical History: has a past surgical history that includes Laser ablation; Carpal tunnel release;  section; Abdominal hysterectomy; Other surgical history; Tubal ligation; and Endometrial ablation.     Family History: family history includes Aneurysm in her mother; Cervical cancer in her mother; Diabetes in her mother; Heart disease in  her father and another family member; Hypertension in her father; Stroke in her paternal grandmother and another family member; Uterine cancer in an other family member.     Social History: reports that she has quit smoking. Her smoking use included cigarettes. She smoked 1.00 pack per day. She has never used smokeless tobacco. She reports current alcohol use of about 1.0 standard drink of alcohol per week. She reports that she does not use drugs.    Allergies: Patient has no known allergies.          Current Outpatient Medications:   •  Accu-Chek Guide test strip, USE TO TEST ONCE DAILY, Disp: 100 each, Rfl: 1  •  metFORMIN (GLUCOPHAGE) 500 MG tablet, TAKE 1 TABLET BY MOUTH TWICE DAILY WITH MEALS, Disp: 180 tablet, Rfl: 1  •  Progesterone (PROMETRIUM) 100 MG capsule, Take 1 capsule by mouth Daily., Disp: 90 capsule, Rfl: 1  •  Thyroid (NP THYROID) 60 MG PO tablet, Take 1 tablet by mouth Daily., Disp: 90 tablet, Rfl: 1  •  phentermine 37.5 MG capsule, Take 1 capsule by mouth Every Morning., Disp: 30 capsule, Rfl: 0  •  vitamin D (ERGOCALCIFEROL) 1.25 MG (09269 UT) capsule capsule, TAKE 1 CAPSULE BY MOUTH 1 TIME WEEKLY, Disp: 13 capsule, Rfl: 1    Medications Discontinued During This Encounter   Medication Reason   • polyethylene glycol (MIRALAX) 17 g packet *Therapy completed   • metoprolol succinate XL (TOPROL-XL) 25 MG 24 hr tablet *Therapy completed   • NP Thyroid 90 MG tablet Reorder         Review of Systems   Constitutional: Negative for fever.   Respiratory: Negative for cough and shortness of breath.    Cardiovascular: Negative for chest pain, palpitations and leg swelling.   Neurological: Negative for dizziness, weakness, numbness and headache.        Objective         Vitals:    08/18/22 0932   BP: 130/64   BP Location: Right arm   Patient Position: Sitting   Cuff Size: Large Adult   Pulse: 98   Resp: 16   Temp: 98 °F (36.7 °C)   TempSrc: Infrared   SpO2: 98%   Weight: 104 kg (228 lb 6.4 oz)   Height:  "161.3 cm (63.5\")     Body mass index is 39.82 kg/m².         Physical Exam  Vitals reviewed.   Constitutional:       Appearance: Normal appearance. She is well-developed.   HENT:      Head: Normocephalic and atraumatic.      Mouth/Throat:      Pharynx: No oropharyngeal exudate.   Eyes:      Conjunctiva/sclera: Conjunctivae normal.      Pupils: Pupils are equal, round, and reactive to light.   Cardiovascular:      Rate and Rhythm: Normal rate and regular rhythm.      Heart sounds: Normal heart sounds. No murmur heard.    No friction rub. No gallop.   Pulmonary:      Effort: Pulmonary effort is normal.      Breath sounds: Normal breath sounds. No wheezing or rhonchi.   Skin:     General: Skin is warm and dry.   Neurological:      Mental Status: She is alert and oriented to person, place, and time.   Psychiatric:         Mood and Affect: Mood and affect normal.         Behavior: Behavior normal.         Thought Content: Thought content normal.         Judgment: Judgment normal.             Result Review :               Assessment and Plan     Diagnoses and all orders for this visit:    1. Screen for colon cancer (Primary)  -     Cologuard - Stool, Per Rectum; Future  -     CBC & Differential; Future  -     Comprehensive Metabolic Panel; Future  -     Hemoglobin A1c; Future  -     Urinalysis With Culture If Indicated -; Future  -     Lipid Panel; Future  -     MicroAlbumin, Urine, Random - Urine, Clean Catch; Future    2. Encounter for screening mammogram for malignant neoplasm of breast  -     Mammo Screening Digital Tomosynthesis Bilateral With CAD    3. Acquired hypothyroidism  -     Thyroid (NP THYROID) 60 MG PO tablet; Take 1 tablet by mouth Daily.  Dispense: 90 tablet; Refill: 1  -     TSH; Future  -     T4; Future  -     T3, free; Future    4. Mixed hyperlipidemia    5. Type 2 diabetes mellitus without complication, without long-term current use of insulin (HCC)    6. Decreased libido  -     FSH & LH; " Future    7. Class 2 obesity due to excess calories with body mass index (BMI) of 39.0 to 39.9 in adult, unspecified whether serious comorbidity present  -     POC Urine Drug Screen [facility]  -     phentermine 37.5 MG capsule; Take 1 capsule by mouth Every Morning.  Dispense: 30 capsule; Refill: 0              Follow Up     Return in about 1 month (around 9/18/2022), or and 6 month.    Patient was given instructions and counseling regarding her condition or for health maintenance advice. Please see specific information pulled into the AVS if appropriate.

## 2022-09-11 ENCOUNTER — PATIENT MESSAGE (OUTPATIENT)
Dept: FAMILY MEDICINE CLINIC | Facility: CLINIC | Age: 57
End: 2022-09-11

## 2022-09-11 DIAGNOSIS — Z79.890 HORMONE REPLACEMENT THERAPY: Primary | ICD-10-CM

## 2022-09-12 RX ORDER — ESTRADIOL 0.5 MG/1
0.5 TABLET ORAL DAILY
Qty: 90 TABLET | Refills: 1 | Status: SHIPPED | OUTPATIENT
Start: 2022-09-12

## 2022-09-12 NOTE — TELEPHONE ENCOUNTER
From: Zahira Ramirez  To: PEYTON Cruz  Sent: 9/11/2022 8:04 PM EDT  Subject: Estrogen patch     Kelvin/Jacky - may I be put on the pill form of estrogen like we discussed as another option, instead of the patch? The patches are $70 a month.

## 2022-10-16 PROBLEM — R00.2 PALPITATIONS: Status: RESOLVED | Noted: 2022-04-08 | Resolved: 2022-10-16

## 2022-10-16 PROBLEM — I47.1 PAROXYSMAL SVT (SUPRAVENTRICULAR TACHYCARDIA): Status: ACTIVE | Noted: 2022-10-16

## 2022-10-16 PROBLEM — I47.10 PAROXYSMAL SVT (SUPRAVENTRICULAR TACHYCARDIA): Status: ACTIVE | Noted: 2022-10-16

## 2022-12-19 DIAGNOSIS — E11.65 TYPE 2 DIABETES MELLITUS WITH HYPERGLYCEMIA, WITHOUT LONG-TERM CURRENT USE OF INSULIN: ICD-10-CM

## 2022-12-29 ENCOUNTER — LAB (OUTPATIENT)
Dept: LAB | Facility: HOSPITAL | Age: 57
End: 2022-12-29
Payer: COMMERCIAL

## 2022-12-29 ENCOUNTER — PATIENT MESSAGE (OUTPATIENT)
Dept: FAMILY MEDICINE CLINIC | Facility: CLINIC | Age: 57
End: 2022-12-29

## 2022-12-29 DIAGNOSIS — E03.9 ACQUIRED HYPOTHYROIDISM: ICD-10-CM

## 2022-12-29 DIAGNOSIS — E03.9 ACQUIRED HYPOTHYROIDISM: Primary | ICD-10-CM

## 2022-12-29 LAB
T3FREE SERPL-MCNC: 3.58 PG/ML (ref 2–4.4)
T4 SERPL-MCNC: 6.29 MCG/DL (ref 4.5–11.7)
TSH SERPL DL<=0.05 MIU/L-ACNC: 2.48 UIU/ML (ref 0.27–4.2)

## 2022-12-29 PROCEDURE — 84481 FREE ASSAY (FT-3): CPT

## 2022-12-29 PROCEDURE — 36415 COLL VENOUS BLD VENIPUNCTURE: CPT

## 2022-12-29 PROCEDURE — 84443 ASSAY THYROID STIM HORMONE: CPT

## 2022-12-29 PROCEDURE — 84436 ASSAY OF TOTAL THYROXINE: CPT

## 2022-12-30 NOTE — TELEPHONE ENCOUNTER
From: Zahira Ramirez  To: Kelvin Khan  Sent: 12/29/2022 9:30 PM EST  Subject: Antibody test    I would like to get an antibody test for Hashimotos scheduled. Is that possible?

## 2023-01-30 DIAGNOSIS — E03.9 ACQUIRED HYPOTHYROIDISM: ICD-10-CM

## 2023-01-30 RX ORDER — THYROID 60 MG
TABLET ORAL
Qty: 90 TABLET | Refills: 1 | Status: SHIPPED | OUTPATIENT
Start: 2023-01-30 | End: 2023-02-17 | Stop reason: SDUPTHER

## 2023-01-30 RX ORDER — ERGOCALCIFEROL 1.25 MG/1
CAPSULE ORAL
Qty: 13 CAPSULE | Refills: 1 | Status: SHIPPED | OUTPATIENT
Start: 2023-01-30

## 2023-02-17 ENCOUNTER — OFFICE VISIT (OUTPATIENT)
Dept: FAMILY MEDICINE CLINIC | Facility: CLINIC | Age: 58
End: 2023-02-17
Payer: COMMERCIAL

## 2023-02-17 VITALS
HEIGHT: 64 IN | DIASTOLIC BLOOD PRESSURE: 74 MMHG | SYSTOLIC BLOOD PRESSURE: 130 MMHG | BODY MASS INDEX: 39.44 KG/M2 | WEIGHT: 231 LBS | TEMPERATURE: 97.2 F | RESPIRATION RATE: 16 BRPM | OXYGEN SATURATION: 97 % | HEART RATE: 89 BPM

## 2023-02-17 DIAGNOSIS — Z78.0 POSTMENOPAUSAL: ICD-10-CM

## 2023-02-17 DIAGNOSIS — E03.9 ACQUIRED HYPOTHYROIDISM: ICD-10-CM

## 2023-02-17 DIAGNOSIS — Z12.31 ENCOUNTER FOR SCREENING MAMMOGRAM FOR MALIGNANT NEOPLASM OF BREAST: ICD-10-CM

## 2023-02-17 DIAGNOSIS — Z12.11 SCREEN FOR COLON CANCER: Primary | ICD-10-CM

## 2023-02-17 DIAGNOSIS — E11.9 TYPE 2 DIABETES MELLITUS WITHOUT COMPLICATION, WITHOUT LONG-TERM CURRENT USE OF INSULIN: ICD-10-CM

## 2023-02-17 DIAGNOSIS — E55.9 VITAMIN D DEFICIENCY: ICD-10-CM

## 2023-02-17 PROCEDURE — 99214 OFFICE O/P EST MOD 30 MIN: CPT | Performed by: NURSE PRACTITIONER

## 2023-02-17 RX ORDER — SEMAGLUTIDE 1.34 MG/ML
0.25 INJECTION, SOLUTION SUBCUTANEOUS WEEKLY
Qty: 1.5 ML | Refills: 1 | Status: SHIPPED | OUTPATIENT
Start: 2023-02-17 | End: 2023-03-29 | Stop reason: SDUPTHER

## 2023-02-17 NOTE — PROGRESS NOTES
Chief Complaint  Diabetes (Discuss ozempic), Hypothyroidism, Fatigue (Still there in the last 6 months worse), and Obesity    Subjective        Zahira Ramirez presents to [facility]  History of Present Illness  Diabetes:  Had been going to a wellness clinic and has found that the horones have worked well but the berberine didn't manage her blood sugars.  Fasting sugars are a little elevated in the 120-130 range.  Would like to add Ozempic to the regimen.      Hypothyroid: Taking medication and working well but fatigue is slightly better but still having fatigued at the end of the day.  Diabetes  Pertinent negatives for hypoglycemia include no dizziness. Associated symptoms include fatigue. Pertinent negatives for diabetes include no chest pain and no weakness.   Hypothyroidism  Associated symptoms include fatigue. Pertinent negatives include no chest pain, coughing, fever, numbness or weakness.   Fatigue  Associated symptoms include fatigue. Pertinent negatives include no chest pain, coughing, fever, numbness or weakness.   Obesity  Associated symptoms include fatigue. Pertinent negatives include no chest pain, coughing, fever, numbness or weakness.       The following portions of the patient's history were personally reviewed and updated as appropriate: allergies, current medications, past medical history, past surgical history, past family history, and past social history.     Body mass index is 40.27 kg/m².    Class 3 Severe Obesity (BMI >=40). Obesity-related health conditions include the following: diabetes mellitus. Obesity is unchanged. BMI is is above average; BMI management plan is completed. We discussed portion control and increasing exercise.      Past History:    Medical History: has a past medical history of Anemia, Broken bones, Diabetes mellitus (HCC), Endometriosis, Forgetfulness, Heart murmur (June this year), Hyperlipemia, Hypothyroidism, Obesity,  Paroxysmal SVT/Atrial Tachycardia (10/16/2022), Pneumonia (Early July), and Stroke ([facility]).     Surgical History: has a past surgical history that includes Laser ablation; Carpal tunnel release;  section; Abdominal hysterectomy; Other surgical history; Tubal ligation; and Endometrial ablation.     Family History: family history includes Aneurysm in her mother; Cervical cancer in her mother; Diabetes in her mother; Heart disease in her father and another family member; Hypertension in her father; Stroke in her paternal grandmother and another family member; Uterine cancer in an other family member.     Social History: reports that she has quit smoking. Her smoking use included cigarettes. She smoked an average of 1 pack per day. She has never used smokeless tobacco. She reports current alcohol use of about 1.0 standard drink per week. She reports that she does not use drugs.    Allergies: Patient has no known allergies.          Current Outpatient Medications:   •  Accu-Chek Guide test strip, USE TO TEST ONCE DAILY, Disp: 100 each, Rfl: 1  •  metFORMIN (GLUCOPHAGE) 500 MG tablet, TAKE 1 TABLET BY MOUTH TWICE DAILY WITH MEALS, Disp: 180 tablet, Rfl: 1  •  Thyroid 81.25 MG PO tablet, Take 1 tablet by mouth Daily., Disp: 90 tablet, Rfl: 1  •  vitamin D (ERGOCALCIFEROL) 1.25 MG (99873 UT) capsule capsule, TAKE 1 CAPSULE BY MOUTH 1 TIME WEEKLY, Disp: 13 capsule, Rfl: 1  •  estradiol (Estrace) 0.5 MG tablet, Take 1 tablet by mouth Daily., Disp: 90 tablet, Rfl: 1  •  Semaglutide,0.25 or 0.5MG/DOS, (Ozempic, 0.25 or 0.5 MG/DOSE,) 2 MG/1.5ML solution pen-injector, Inject 0.25 mg under the skin into the appropriate area as directed 1 (One) Time Per Week. For 4 weeks then increase to 0.5 mg weekly., Disp: 1.5 mL, Rfl: 1    Medications Discontinued During This Encounter   Medication Reason   • phentermine 37.5 MG capsule *Therapy completed   • Progesterone (PROMETRIUM) 100 MG capsule *Therapy completed   • [place] Thyroid  "60 MG tablet Reorder         Review of Systems   Constitutional: Positive for fatigue. Negative for fever.   Respiratory: Negative for cough and shortness of breath.    Cardiovascular: Negative for chest pain, palpitations and leg swelling.   Neurological: Negative for dizziness, weakness, numbness and headache.        Objective         Vitals:    02/17/23 1102   BP: 130/74   BP Location: Right arm   Patient Position: Sitting   Cuff Size: Large Adult   Pulse: 89   Resp: 16   Temp: 97.2 °F (36.2 °C)   TempSrc: Temporal   SpO2: 97%   Weight: 105 kg (231 lb)   Height: 161.3 cm (63.5\")     Body mass index is 40.27 kg/m².         Physical Exam  Vitals reviewed.   Constitutional:       Appearance: Normal appearance. She is well-developed.   HENT:      Head: Normocephalic and atraumatic.      Mouth/Throat:      Pharynx: No oropharyngeal exudate.   Eyes:      Conjunctiva/sclera: Conjunctivae normal.      Pupils: Pupils are equal, round, and reactive to light.   Cardiovascular:      Rate and Rhythm: Normal rate and regular rhythm.      Heart sounds: Normal heart sounds. No murmur heard.    No friction rub. No gallop.   Pulmonary:      Effort: Pulmonary effort is normal.      Breath sounds: Normal breath sounds. No wheezing or rhonchi.   Skin:     General: Skin is warm and dry.   Neurological:      Mental Status: She is alert and oriented to person, place, and time.   Psychiatric:         Mood and Affect: Mood and affect normal.         Behavior: Behavior normal.         Thought Content: Thought content normal.         Judgment: Judgment normal.             Result Review :               Assessment and Plan     Diagnoses and all orders for this visit:    1. Screen for colon cancer (Primary)  -     Cologuard - Stool, Per Rectum; Future    2. Encounter for screening mammogram for malignant neoplasm of breast  -     Mammo Screening Digital Tomosynthesis Bilateral With CAD; Future    3. Type 2 diabetes mellitus without " complication, without long-term current use of insulin (HCC)  -     Semaglutide,0.25 or 0.5MG/DOS, (Ozempic, 0.25 or 0.5 MG/DOSE,) 2 MG/1.5ML solution pen-injector; Inject 0.25 mg under the skin into the appropriate area as directed 1 (One) Time Per Week. For 4 weeks then increase to 0.5 mg weekly.  Dispense: 1.5 mL; Refill: 1  -     CBC & Differential; Future  -     Comprehensive Metabolic Panel; Future  -     Hemoglobin A1c; Future  -     Urinalysis With Culture If Indicated -; Future  -     Lipid Panel; Future  -     MicroAlbumin, Urine, Random - Urine, Clean Catch; Future    4. Postmenopausal  -     DEXA Bone Density Axial; Future    5. Acquired hypothyroidism  -     TSH; Future  -     Thyroid 81.25 MG PO tablet; Take 1 tablet by mouth Daily.  Dispense: 90 tablet; Refill: 1  -     TSH; Future    6. Vitamin D deficiency  -     Vitamin D,25-Hydroxy; Future              Follow Up     Return in about 6 months (around 8/17/2023).    Patient was given instructions and counseling regarding her condition or for health maintenance advice. Please see specific information pulled into the AVS if appropriate.

## 2023-02-21 ENCOUNTER — PATIENT MESSAGE (OUTPATIENT)
Dept: FAMILY MEDICINE CLINIC | Facility: CLINIC | Age: 58
End: 2023-02-21
Payer: COMMERCIAL

## 2023-02-21 DIAGNOSIS — E03.9 ACQUIRED HYPOTHYROIDISM: ICD-10-CM

## 2023-02-22 ENCOUNTER — TELEPHONE (OUTPATIENT)
Dept: FAMILY MEDICINE CLINIC | Facility: CLINIC | Age: 58
End: 2023-02-22
Payer: COMMERCIAL

## 2023-02-22 DIAGNOSIS — E03.9 ACQUIRED HYPOTHYROIDISM: Primary | ICD-10-CM

## 2023-02-22 RX ORDER — THYROID, PORCINE 60 MG/1
65 TABLET ORAL DAILY
Qty: 90 TABLET | Refills: 1 | Status: SHIPPED | OUTPATIENT
Start: 2023-02-22 | End: 2023-02-22 | Stop reason: CLARIF

## 2023-02-22 RX ORDER — LEVOTHYROXINE AND LIOTHYRONINE 57; 13.5 UG/1; UG/1
90 TABLET ORAL DAILY
Qty: 90 TABLET | Refills: 0 | Status: SHIPPED | OUTPATIENT
Start: 2023-02-22

## 2023-02-22 NOTE — TELEPHONE ENCOUNTER
Talked to the pharmacy the Thyroid only come in 30, 60, or 90.  Pt was on 60,  Pills can be cut in half but they are very small she said. Please advise.

## 2023-02-22 NOTE — TELEPHONE ENCOUNTER
From: Zahira Ramirez  To: Kelvin Khan  Sent: 2/21/2023 1:23 PM EST  Subject: New thyroid prescription     The pharmacy said the new thyroid prescription that you gave me is no longer made it's discontinued

## 2023-02-22 NOTE — TELEPHONE ENCOUNTER
----- Message from Zahira Ramirez sent at 2/22/2023  9:54 AM EST -----  Regarding: New thyroid prescription   Contact: [phone]  [address]

## 2023-03-27 ENCOUNTER — LAB (OUTPATIENT)
Dept: LAB | Facility: HOSPITAL | Age: 58
End: 2023-03-27
Payer: COMMERCIAL

## 2023-03-27 DIAGNOSIS — Z12.11 SCREEN FOR COLON CANCER: ICD-10-CM

## 2023-03-27 DIAGNOSIS — E03.9 ACQUIRED HYPOTHYROIDISM: ICD-10-CM

## 2023-03-27 LAB
ALBUMIN SERPL-MCNC: 4.4 G/DL (ref 3.5–5.2)
ALBUMIN UR-MCNC: <1.2 MG/DL
ALBUMIN/GLOB SERPL: 1.7 G/DL
ALP SERPL-CCNC: 78 U/L (ref 39–117)
ALT SERPL W P-5'-P-CCNC: 49 U/L (ref 1–33)
ANION GAP SERPL CALCULATED.3IONS-SCNC: 10.7 MMOL/L (ref 5–15)
AST SERPL-CCNC: 23 U/L (ref 1–32)
BASOPHILS # BLD AUTO: 0.07 10*3/MM3 (ref 0–0.2)
BASOPHILS NFR BLD AUTO: 1.2 % (ref 0–1.5)
BILIRUB SERPL-MCNC: 0.3 MG/DL (ref 0–1.2)
BILIRUB UR QL STRIP: NEGATIVE
BUN SERPL-MCNC: 12 MG/DL (ref 6–20)
BUN/CREAT SERPL: 16.2 (ref 7–25)
CALCIUM SPEC-SCNC: 9.2 MG/DL (ref 8.6–10.5)
CHLORIDE SERPL-SCNC: 106 MMOL/L (ref 98–107)
CHOLEST SERPL-MCNC: 176 MG/DL (ref 0–200)
CLARITY UR: ABNORMAL
CO2 SERPL-SCNC: 27.3 MMOL/L (ref 22–29)
COLOR UR: YELLOW
CREAT SERPL-MCNC: 0.74 MG/DL (ref 0.57–1)
DEPRECATED RDW RBC AUTO: 40.2 FL (ref 37–54)
EGFRCR SERPLBLD CKD-EPI 2021: 95.7 ML/MIN/1.73
EOSINOPHIL # BLD AUTO: 0.19 10*3/MM3 (ref 0–0.4)
EOSINOPHIL NFR BLD AUTO: 3.2 % (ref 0.3–6.2)
ERYTHROCYTE [DISTWIDTH] IN BLOOD BY AUTOMATED COUNT: 13.1 % (ref 12.3–15.4)
GLOBULIN UR ELPH-MCNC: 2.6 GM/DL
GLUCOSE SERPL-MCNC: 111 MG/DL (ref 65–99)
GLUCOSE UR STRIP-MCNC: NEGATIVE MG/DL
HBA1C MFR BLD: 5.7 % (ref 4.8–5.6)
HCT VFR BLD AUTO: 39.1 % (ref 34–46.6)
HDLC SERPL-MCNC: 32 MG/DL (ref 40–60)
HGB BLD-MCNC: 13.2 G/DL (ref 12–15.9)
HGB UR QL STRIP.AUTO: NEGATIVE
IMM GRANULOCYTES # BLD AUTO: 0.02 10*3/MM3 (ref 0–0.05)
IMM GRANULOCYTES NFR BLD AUTO: 0.3 % (ref 0–0.5)
KETONES UR QL STRIP: NEGATIVE
LDLC SERPL CALC-MCNC: 105 MG/DL (ref 0–100)
LDLC/HDLC SERPL: 3.08 {RATIO}
LEUKOCYTE ESTERASE UR QL STRIP.AUTO: NEGATIVE
LYMPHOCYTES # BLD AUTO: 1.73 10*3/MM3 (ref 0.7–3.1)
LYMPHOCYTES NFR BLD AUTO: 28.9 % (ref 19.6–45.3)
MCH RBC QN AUTO: 28.7 PG (ref 26.6–33)
MCHC RBC AUTO-ENTMCNC: 33.8 G/DL (ref 31.5–35.7)
MCV RBC AUTO: 85 FL (ref 79–97)
MONOCYTES # BLD AUTO: 0.55 10*3/MM3 (ref 0.1–0.9)
MONOCYTES NFR BLD AUTO: 9.2 % (ref 5–12)
NEUTROPHILS NFR BLD AUTO: 3.42 10*3/MM3 (ref 1.7–7)
NEUTROPHILS NFR BLD AUTO: 57.2 % (ref 42.7–76)
NITRITE UR QL STRIP: NEGATIVE
NRBC BLD AUTO-RTO: 0 /100 WBC (ref 0–0.2)
PH UR STRIP.AUTO: 5.5 [PH] (ref 5–8)
PLATELET # BLD AUTO: 268 10*3/MM3 (ref 140–450)
PMV BLD AUTO: 9.8 FL (ref 6–12)
POTASSIUM SERPL-SCNC: 3.9 MMOL/L (ref 3.5–5.2)
PROT SERPL-MCNC: 7 G/DL (ref 6–8.5)
PROT UR QL STRIP: NEGATIVE
RBC # BLD AUTO: 4.6 10*6/MM3 (ref 3.77–5.28)
SODIUM SERPL-SCNC: 144 MMOL/L (ref 136–145)
SP GR UR STRIP: 1.02 (ref 1–1.03)
TRIGL SERPL-MCNC: 227 MG/DL (ref 0–150)
TSH SERPL DL<=0.05 MIU/L-ACNC: 1.79 UIU/ML (ref 0.27–4.2)
UROBILINOGEN UR QL STRIP: ABNORMAL
VLDLC SERPL-MCNC: 39 MG/DL (ref 5–40)
WBC NRBC COR # BLD: 5.98 10*3/MM3 (ref 3.4–10.8)

## 2023-03-27 PROCEDURE — 36415 COLL VENOUS BLD VENIPUNCTURE: CPT

## 2023-03-27 PROCEDURE — 85025 COMPLETE CBC W/AUTO DIFF WBC: CPT

## 2023-03-27 PROCEDURE — 81003 URINALYSIS AUTO W/O SCOPE: CPT

## 2023-03-27 PROCEDURE — 82043 UR ALBUMIN QUANTITATIVE: CPT

## 2023-03-27 PROCEDURE — 86376 MICROSOMAL ANTIBODY EACH: CPT

## 2023-03-27 PROCEDURE — 84443 ASSAY THYROID STIM HORMONE: CPT

## 2023-03-27 PROCEDURE — 83036 HEMOGLOBIN GLYCOSYLATED A1C: CPT

## 2023-03-27 PROCEDURE — 80053 COMPREHEN METABOLIC PANEL: CPT

## 2023-03-27 PROCEDURE — 80061 LIPID PANEL: CPT

## 2023-03-28 LAB — THYROPEROXIDASE AB SERPL-ACNC: <9 IU/ML (ref 0–34)

## 2023-03-29 ENCOUNTER — TELEPHONE (OUTPATIENT)
Dept: FAMILY MEDICINE CLINIC | Facility: CLINIC | Age: 58
End: 2023-03-29
Payer: COMMERCIAL

## 2023-03-29 ENCOUNTER — PATIENT MESSAGE (OUTPATIENT)
Dept: FAMILY MEDICINE CLINIC | Facility: CLINIC | Age: 58
End: 2023-03-29
Payer: COMMERCIAL

## 2023-03-29 DIAGNOSIS — E11.9 TYPE 2 DIABETES MELLITUS WITHOUT COMPLICATION, WITHOUT LONG-TERM CURRENT USE OF INSULIN: ICD-10-CM

## 2023-03-29 RX ORDER — SEMAGLUTIDE 1.34 MG/ML
0.5 INJECTION, SOLUTION SUBCUTANEOUS WEEKLY
Qty: 1.5 ML | Refills: 1 | Status: SHIPPED | OUTPATIENT
Start: 2023-03-29 | End: 2023-03-29 | Stop reason: SDUPTHER

## 2023-03-29 NOTE — TELEPHONE ENCOUNTER
----- Message from Zahira Ramirez sent at 3/29/2023 10:06 AM EDT -----  Regarding: Question regarding URINALYSIS W/ CULTURE IF INDICATED  Contact: [phone]  [facility] notified me- but I have already been on the .5 dose, so need to go up to higher dose from there.

## 2023-03-29 NOTE — TELEPHONE ENCOUNTER
From: Zahira Ramirez  To: Kelvin Khan  Sent: 3/29/2023 7:15 AM EDT  Subject: Question regarding URINALYSIS W/ CULTURE IF INDICATED    Can we move up in dose on Ozempic now ?

## 2023-05-01 RX ORDER — SEMAGLUTIDE 2.68 MG/ML
2 INJECTION, SOLUTION SUBCUTANEOUS WEEKLY
Qty: 8 ML | Refills: 5 | Status: SHIPPED | OUTPATIENT
Start: 2023-05-01

## 2023-05-01 NOTE — TELEPHONE ENCOUNTER
----- Message from Zahira Ramirez sent at 4/28/2023  5:38 PM EDT -----  Regarding: Ozempic  Contact: [phone]  Kelvin- I am ready to refill on Ozempic. Can I go to the highest dose?

## 2023-08-07 ENCOUNTER — LAB (OUTPATIENT)
Dept: LAB | Facility: HOSPITAL | Age: 58
End: 2023-08-07
Payer: COMMERCIAL

## 2023-08-07 DIAGNOSIS — E11.9 TYPE 2 DIABETES MELLITUS WITHOUT COMPLICATION, WITHOUT LONG-TERM CURRENT USE OF INSULIN: ICD-10-CM

## 2023-08-07 DIAGNOSIS — E55.9 VITAMIN D DEFICIENCY: ICD-10-CM

## 2023-08-07 DIAGNOSIS — E03.9 ACQUIRED HYPOTHYROIDISM: ICD-10-CM

## 2023-08-07 LAB
25(OH)D3 SERPL-MCNC: 51.5 NG/ML (ref 30–100)
ALBUMIN SERPL-MCNC: 4.4 G/DL (ref 3.5–5.2)
ALBUMIN UR-MCNC: <1.2 MG/DL
ALBUMIN/GLOB SERPL: 1.8 G/DL
ALP SERPL-CCNC: 87 U/L (ref 39–117)
ALT SERPL W P-5'-P-CCNC: 39 U/L (ref 1–33)
ANION GAP SERPL CALCULATED.3IONS-SCNC: 7 MMOL/L (ref 5–15)
AST SERPL-CCNC: 21 U/L (ref 1–32)
BASOPHILS # BLD AUTO: 0.07 10*3/MM3 (ref 0–0.2)
BASOPHILS NFR BLD AUTO: 1.2 % (ref 0–1.5)
BILIRUB SERPL-MCNC: <0.2 MG/DL (ref 0–1.2)
BILIRUB UR QL STRIP: NEGATIVE
BUN SERPL-MCNC: 13 MG/DL (ref 6–20)
BUN/CREAT SERPL: 18.8 (ref 7–25)
CALCIUM SPEC-SCNC: 9 MG/DL (ref 8.6–10.5)
CHLORIDE SERPL-SCNC: 106 MMOL/L (ref 98–107)
CHOLEST SERPL-MCNC: 187 MG/DL (ref 0–200)
CLARITY UR: CLEAR
CO2 SERPL-SCNC: 28 MMOL/L (ref 22–29)
COLOR UR: YELLOW
CREAT SERPL-MCNC: 0.69 MG/DL (ref 0.57–1)
DEPRECATED RDW RBC AUTO: 40 FL (ref 37–54)
EGFRCR SERPLBLD CKD-EPI 2021: 102.6 ML/MIN/1.73
EOSINOPHIL # BLD AUTO: 0.15 10*3/MM3 (ref 0–0.4)
EOSINOPHIL NFR BLD AUTO: 2.6 % (ref 0.3–6.2)
ERYTHROCYTE [DISTWIDTH] IN BLOOD BY AUTOMATED COUNT: 13 % (ref 12.3–15.4)
GLOBULIN UR ELPH-MCNC: 2.4 GM/DL
GLUCOSE SERPL-MCNC: 108 MG/DL (ref 65–99)
GLUCOSE UR STRIP-MCNC: NEGATIVE MG/DL
HBA1C MFR BLD: 5.3 % (ref 4.8–5.6)
HCT VFR BLD AUTO: 40.7 % (ref 34–46.6)
HDLC SERPL-MCNC: 35 MG/DL (ref 40–60)
HGB BLD-MCNC: 13.8 G/DL (ref 12–15.9)
HGB UR QL STRIP.AUTO: NEGATIVE
IMM GRANULOCYTES # BLD AUTO: 0.02 10*3/MM3 (ref 0–0.05)
IMM GRANULOCYTES NFR BLD AUTO: 0.3 % (ref 0–0.5)
KETONES UR QL STRIP: NEGATIVE
LDLC SERPL CALC-MCNC: 119 MG/DL (ref 0–100)
LDLC/HDLC SERPL: 3.29 {RATIO}
LEUKOCYTE ESTERASE UR QL STRIP.AUTO: NEGATIVE
LYMPHOCYTES # BLD AUTO: 1.78 10*3/MM3 (ref 0.7–3.1)
LYMPHOCYTES NFR BLD AUTO: 30.4 % (ref 19.6–45.3)
MCH RBC QN AUTO: 28.5 PG (ref 26.6–33)
MCHC RBC AUTO-ENTMCNC: 33.9 G/DL (ref 31.5–35.7)
MCV RBC AUTO: 84.1 FL (ref 79–97)
MONOCYTES # BLD AUTO: 0.48 10*3/MM3 (ref 0.1–0.9)
MONOCYTES NFR BLD AUTO: 8.2 % (ref 5–12)
NEUTROPHILS NFR BLD AUTO: 3.36 10*3/MM3 (ref 1.7–7)
NEUTROPHILS NFR BLD AUTO: 57.3 % (ref 42.7–76)
NITRITE UR QL STRIP: NEGATIVE
NRBC BLD AUTO-RTO: 0 /100 WBC (ref 0–0.2)
PH UR STRIP.AUTO: 6 [PH] (ref 5–8)
PLATELET # BLD AUTO: 300 10*3/MM3 (ref 140–450)
PMV BLD AUTO: 10 FL (ref 6–12)
POTASSIUM SERPL-SCNC: 4.1 MMOL/L (ref 3.5–5.2)
PROT SERPL-MCNC: 6.8 G/DL (ref 6–8.5)
PROT UR QL STRIP: NEGATIVE
RBC # BLD AUTO: 4.84 10*6/MM3 (ref 3.77–5.28)
SODIUM SERPL-SCNC: 141 MMOL/L (ref 136–145)
SP GR UR STRIP: 1.02 (ref 1–1.03)
TRIGL SERPL-MCNC: 185 MG/DL (ref 0–150)
TSH SERPL DL<=0.05 MIU/L-ACNC: 1.57 UIU/ML (ref 0.27–4.2)
UROBILINOGEN UR QL STRIP: NORMAL
VLDLC SERPL-MCNC: 33 MG/DL (ref 5–40)
WBC NRBC COR # BLD: 5.86 10*3/MM3 (ref 3.4–10.8)

## 2023-08-07 PROCEDURE — 84443 ASSAY THYROID STIM HORMONE: CPT

## 2023-08-07 PROCEDURE — 83036 HEMOGLOBIN GLYCOSYLATED A1C: CPT

## 2023-08-07 PROCEDURE — 85025 COMPLETE CBC W/AUTO DIFF WBC: CPT

## 2023-08-07 PROCEDURE — 82043 UR ALBUMIN QUANTITATIVE: CPT

## 2023-08-07 PROCEDURE — 80061 LIPID PANEL: CPT

## 2023-08-07 PROCEDURE — 80053 COMPREHEN METABOLIC PANEL: CPT

## 2023-08-07 PROCEDURE — 36415 COLL VENOUS BLD VENIPUNCTURE: CPT

## 2023-08-07 PROCEDURE — 81003 URINALYSIS AUTO W/O SCOPE: CPT

## 2023-08-07 PROCEDURE — 82306 VITAMIN D 25 HYDROXY: CPT

## 2023-08-18 ENCOUNTER — OFFICE VISIT (OUTPATIENT)
Dept: FAMILY MEDICINE CLINIC | Facility: CLINIC | Age: 58
End: 2023-08-18
Payer: COMMERCIAL

## 2023-08-18 VITALS
WEIGHT: 234 LBS | HEIGHT: 64 IN | RESPIRATION RATE: 16 BRPM | OXYGEN SATURATION: 96 % | SYSTOLIC BLOOD PRESSURE: 120 MMHG | BODY MASS INDEX: 39.95 KG/M2 | DIASTOLIC BLOOD PRESSURE: 78 MMHG | HEART RATE: 86 BPM | TEMPERATURE: 97.3 F

## 2023-08-18 DIAGNOSIS — E55.9 VITAMIN D DEFICIENCY: Primary | ICD-10-CM

## 2023-08-18 DIAGNOSIS — Z79.890 HORMONE REPLACEMENT THERAPY: ICD-10-CM

## 2023-08-18 DIAGNOSIS — E03.9 ACQUIRED HYPOTHYROIDISM: ICD-10-CM

## 2023-08-18 DIAGNOSIS — Z23 NEED FOR TDAP VACCINATION: ICD-10-CM

## 2023-08-18 DIAGNOSIS — E11.9 TYPE 2 DIABETES MELLITUS WITHOUT COMPLICATION, WITHOUT LONG-TERM CURRENT USE OF INSULIN: ICD-10-CM

## 2023-08-18 RX ORDER — TIRZEPATIDE 2.5 MG/.5ML
2.5 INJECTION, SOLUTION SUBCUTANEOUS WEEKLY
Qty: 2 ML | Refills: 0 | Status: SHIPPED | OUTPATIENT
Start: 2023-08-18

## 2023-08-18 NOTE — PROGRESS NOTES
Chief Complaint  Diabetes, Hypothyroidism, Fatigue, and Obesity    Subjective        Zahira Ramirez presents to [facility]  History of Present Illness  Diabetes:  Had been going to a wellness clinic and has found that the horones have worked well but the berberine didn't manage her blood sugars.    Hormone replacement therapy doing well on progesterone.    Would like to restart phentermine for a month.  Understands risk of PPH.  Need to lose 4 pounds a month.    Hypothyroid:  States still having issues with thyroid and having palpitations.  Started taking 1/2 tab and is not having palpitations and extreme fatigue.  Diabetes  Pertinent negatives for hypoglycemia include no dizziness. Associated symptoms include fatigue. Pertinent negatives for diabetes include no chest pain and no weakness.   Hypothyroidism  Associated symptoms include fatigue. Pertinent negatives include no chest pain, coughing, fever, numbness or weakness.   Fatigue  Associated symptoms include fatigue. Pertinent negatives include no chest pain, coughing, fever, numbness or weakness.   Obesity  Associated symptoms include fatigue. Pertinent negatives include no chest pain, coughing, fever, numbness or weakness.     The following portions of the patient's history were personally reviewed and updated as appropriate: allergies, current medications, past medical history, past surgical history, past family history, and past social history.     Body mass index is 40.8 kg/mý.           Past History:    Medical History: has a past medical history of Anemia, Broken bones, Diabetes mellitus, Endometriosis, Forgetfulness, Heart murmur ( this year), Hyperlipemia, Hypothyroidism, Obesity, Paroxysmal SVT/Atrial Tachycardia (10/16/2022), Pneumonia (Early July), and Stroke.     Surgical History: has a past surgical history that includes Laser ablation; Carpal tunnel release;  section; Abdominal hysterectomy;  Other surgical history; Tubal ligation; and Endometrial ablation.     Family History: family history includes Aneurysm in her mother; Cervical cancer in her mother; Diabetes in her mother; Heart disease in her father and another family member; Hypertension in her father; Stroke in her paternal grandmother and another family member; Uterine cancer in an other family member.     Social History: reports that she has quit smoking. Her smoking use included cigarettes. She smoked an average of 1 pack per day. She has never used smokeless tobacco. She reports current alcohol use of about 1.0 standard drink per week. She reports that she does not use drugs.    Allergies: Patient has no known allergies.          Current Outpatient Medications:     Accu-Chek Guide test strip, USE TO TEST ONCE DAILY, Disp: 100 each, Rfl: 1    metFORMIN (GLUCOPHAGE) 500 MG tablet, TAKE 1 TABLET BY MOUTH TWICE DAILY WITH MEALS, Disp: 180 tablet, Rfl: 1    Thyroid (ARMOUR THYROID) 90 MG PO tablet, Take 1 tablet by mouth Daily., Disp: 90 tablet, Rfl: 0    vitamin D (ERGOCALCIFEROL) 1.25 MG (65493 UT) capsule capsule, TAKE 1 CAPSULE BY MOUTH 1 TIME WEEKLY, Disp: 13 capsule, Rfl: 1    Tirzepatide (Mounjaro) 2.5 MG/0.5ML solution pen-injector, Inject 0.5 mL under the skin into the appropriate area as directed 1 (One) Time Per Week., Disp: 2 mL, Rfl: 0    Medications Discontinued During This Encounter   Medication Reason    estradiol (Estrace) 0.5 MG tablet *Therapy completed    Semaglutide, 2 MG/DOSE, (Ozempic, 2 MG/DOSE,) 8 MG/3ML solution pen-injector *Therapy completed         Review of Systems   Constitutional:  Positive for fatigue. Negative for fever.   Respiratory:  Negative for cough and shortness of breath.    Cardiovascular:  Negative for chest pain, palpitations and leg swelling.   Neurological:  Negative for dizziness, weakness, numbness and headache.      Objective         Vitals:    08/18/23 1037   BP: 120/78   BP Location: Right arm  "  Patient Position: Sitting   Cuff Size: Large Adult   Pulse: 86   Resp: 16   Temp: 97.3 øF (36.3 øC)   TempSrc: Temporal   SpO2: 96%   Weight: 106 kg (234 lb)   Height: 161.3 cm (63.5\")     Body mass index is 40.8 kg/mý.         Physical Exam  Vitals reviewed.   Constitutional:       Appearance: Normal appearance. She is well-developed.   HENT:      Head: Normocephalic and atraumatic.      Right Ear: External ear normal.      Left Ear: External ear normal.      Mouth/Throat:      Pharynx: No oropharyngeal exudate.   Eyes:      Conjunctiva/sclera: Conjunctivae normal.      Pupils: Pupils are equal, round, and reactive to light.   Cardiovascular:      Rate and Rhythm: Normal rate and regular rhythm.      Heart sounds: No murmur heard.    No friction rub. No gallop.   Pulmonary:      Effort: Pulmonary effort is normal.      Breath sounds: Normal breath sounds. No wheezing or rhonchi.   Abdominal:      General: Bowel sounds are normal. There is no distension.      Palpations: Abdomen is soft.      Tenderness: There is no abdominal tenderness.   Skin:     General: Skin is warm and dry.   Neurological:      Mental Status: She is alert and oriented to person, place, and time.      Cranial Nerves: No cranial nerve deficit.   Psychiatric:         Mood and Affect: Mood and affect normal.         Behavior: Behavior normal.         Thought Content: Thought content normal.         Judgment: Judgment normal.           Result Review :               Assessment and Plan     Diagnoses and all orders for this visit:    1. Vitamin D deficiency (Primary)  -     Vitamin D,25-Hydroxy; Future    2. Hormone replacement therapy    3. Type 2 diabetes mellitus without complication, without long-term current use of insulin  -     Tirzepatide (Mounjaro) 2.5 MG/0.5ML solution pen-injector; Inject 0.5 mL under the skin into the appropriate area as directed 1 (One) Time Per Week.  Dispense: 2 mL; Refill: 0  -     CBC & Differential; Future  -     " Comprehensive Metabolic Panel; Future  -     Hemoglobin A1c; Future  -     Urinalysis With Culture If Indicated -; Future  -     Lipid Panel; Future  -     Microalbumin / Creatinine Urine Ratio - Urine, Clean Catch; Future    4. Acquired hypothyroidism  -     TSH; Future    Other orders  -     Tdap Vaccine => 8yo IM (BOOSTRIX)              Follow Up     Return in about 6 months (around 2/18/2024).    Patient was given instructions and counseling regarding her condition or for health maintenance advice. Please see specific information pulled into the AVS if appropriate.

## 2023-08-21 ENCOUNTER — HOSPITAL ENCOUNTER (OUTPATIENT)
Dept: BONE DENSITY | Facility: HOSPITAL | Age: 58
Discharge: HOME | End: 2023-08-21
Payer: COMMERCIAL

## 2023-08-21 ENCOUNTER — HOSPITAL ENCOUNTER (OUTPATIENT)
Dept: MAMMOGRAPHY | Facility: HOSPITAL | Age: 58
Discharge: HOME | End: 2023-08-21
Payer: COMMERCIAL

## 2023-08-21 DIAGNOSIS — Z12.31 ENCOUNTER FOR SCREENING MAMMOGRAM FOR MALIGNANT NEOPLASM OF BREAST: ICD-10-CM

## 2023-08-21 DIAGNOSIS — Z78.0 POSTMENOPAUSAL: ICD-10-CM

## 2023-08-21 PROCEDURE — 77080 DXA BONE DENSITY AXIAL: CPT

## 2023-08-21 PROCEDURE — 77063 BREAST TOMOSYNTHESIS BI: CPT

## 2023-08-21 PROCEDURE — 77067 SCR MAMMO BI INCL CAD: CPT

## 2023-08-23 DIAGNOSIS — R92.8 ABNORMAL MAMMOGRAM OF LEFT BREAST: Primary | ICD-10-CM

## 2023-09-06 ENCOUNTER — HOSPITAL ENCOUNTER (OUTPATIENT)
Dept: MAMMOGRAPHY | Facility: HOSPITAL | Age: 58
Discharge: HOME | End: 2023-09-06
Payer: COMMERCIAL

## 2023-09-06 ENCOUNTER — HOSPITAL ENCOUNTER (OUTPATIENT)
Dept: ULTRASOUND IMAGING | Facility: HOSPITAL | Age: 58
Discharge: HOME | End: 2023-09-06
Payer: COMMERCIAL

## 2023-09-06 DIAGNOSIS — R92.8 ABNORMAL MAMMOGRAM OF LEFT BREAST: ICD-10-CM

## 2023-09-06 PROCEDURE — G0279 TOMOSYNTHESIS, MAMMO: HCPCS

## 2023-09-06 PROCEDURE — 77065 DX MAMMO INCL CAD UNI: CPT

## 2023-09-10 ENCOUNTER — PATIENT MESSAGE (OUTPATIENT)
Dept: FAMILY MEDICINE CLINIC | Facility: CLINIC | Age: 58
End: 2023-09-10
Payer: COMMERCIAL

## 2023-09-10 DIAGNOSIS — E11.9 TYPE 2 DIABETES MELLITUS WITHOUT COMPLICATION, WITHOUT LONG-TERM CURRENT USE OF INSULIN: Primary | ICD-10-CM

## 2023-09-10 DIAGNOSIS — E03.9 ACQUIRED HYPOTHYROIDISM: ICD-10-CM

## 2023-09-11 RX ORDER — THYROID,PORK 90 MG
TABLET ORAL
Qty: 90 TABLET | Refills: 0 | Status: SHIPPED | OUTPATIENT
Start: 2023-09-11

## 2023-09-11 RX ORDER — ERGOCALCIFEROL 1.25 MG/1
50000 CAPSULE ORAL
Qty: 13 CAPSULE | Refills: 1 | Status: SHIPPED | OUTPATIENT
Start: 2023-09-11

## 2023-09-11 NOTE — TELEPHONE ENCOUNTER
From: Zahira Ramirez  To: Kelvin Khan  Sent: 9/10/2023 9:06 AM EDT  Subject: Mounjaro    Are we going to go up in dose of mounjaro? I will need a prescription refill, thank you!

## 2023-09-20 DIAGNOSIS — Z12.31 ENCOUNTER FOR SCREENING MAMMOGRAM FOR MALIGNANT NEOPLASM OF BREAST: Primary | ICD-10-CM

## 2023-10-08 ENCOUNTER — PATIENT MESSAGE (OUTPATIENT)
Dept: FAMILY MEDICINE CLINIC | Facility: CLINIC | Age: 58
End: 2023-10-08
Payer: COMMERCIAL

## 2023-10-08 DIAGNOSIS — E11.9 TYPE 2 DIABETES MELLITUS WITHOUT COMPLICATION, WITHOUT LONG-TERM CURRENT USE OF INSULIN: Primary | ICD-10-CM

## 2023-10-09 RX ORDER — TIRZEPATIDE 7.5 MG/.5ML
7.5 INJECTION, SOLUTION SUBCUTANEOUS WEEKLY
Qty: 6 ML | Refills: 1 | Status: SHIPPED | OUTPATIENT
Start: 2023-10-09

## 2023-10-09 NOTE — TELEPHONE ENCOUNTER
From: Zahira Ramirez  To: Kelvin Khan  Sent: 10/8/2023 9:17 AM EDT  Subject: Mounjaro    Can you send next higher dose in [facility] to [facility]?  Thank you!   Progress Notes by Karan Cook PA-C at 2/26/2019  9:20 AM     Author: Karan Cook PA-C Service: -- Author Type: Physician Assistant    Filed: 2/27/2019 11:25 AM Encounter Date: 2/26/2019 Status: Signed    : Karan Cook PA-C (Physician Assistant)       Subjective:      Patient ID: Melanie Love is a 67 y.o. female.    Chief Complaint:    HPI  Melanie Love is a 67 y.o. female who presents today complaining of a four day history of irritative voiding symptoms to include urinary hesitancy urgency frequency and dysuria.  Patient denies gross hematuria.  Denies fever chills night sweats fatigue or other red flag symptoms to include back or flank pain and no vaginal discharge.  She has had a recent urinary tract infections does feel similar to how her other symptoms have been.    Past Medical History:   Diagnosis Date   ? Breast cancer (H)    ? Gluten intolerance 3/24/2015   ? Hypertension     Created by [facility] Annotation: Jan 4 2011  8:39AM - Jaycee Vasquez: controlled    ? Hypothyroidism     Created by Conversion    ? Vitamin B12 Deficiency     Created by Conversion    ? Vitamin D Deficiency     Created by Conversion        Past Surgical History:   Procedure Laterality Date   ? BREAST LUMPECTOMY Right 2004    breast cancer.  Treated with lumpectomy, radiation, and chemo   ? MASTECTOMY Bilateral 2011 approx    benign; done due to hx of CA and BRCA positive   ? OR EXCISE BREAST CYST      Description: Breast Surgery Lumpectomy;  Recorded: 08/02/2010;   ? OR REMOVAL OF OVARY(S)      Description: Oophorectomy - Bilateral (Removal Of Both Ovaries);  Recorded: 03/25/2011;  Comments: Prophalactic due to Hx of BRCA 1 Gene mutation.       Family History   Problem Relation Age of Onset   ? Cancer Mother         Sarcoma   ? Dementia Father         Lewy body, proven on autopsy   ? Dementia Paternal Grandmother    ? Breast cancer Sister        Social History     Tobacco Use   ? Smoking  status: Former Smoker     Last attempt to quit: 1985     Years since quittin.1   ? Smokeless tobacco: Former User   Substance Use Topics   ? Alcohol use: No   ? Drug use: Not on file       Review of Systems  As above in HPI, otherwise balance of Review of Systems are negative.    Objective:     /76   Pulse (!) 58   Temp 98  F (36.7  C) (Oral)   Resp 15   Wt 167 lb (75.8 kg)   SpO2 97%   BMI 25.77 kg/m      Physical Exam   Constitutional: She is oriented to person, place, and time. She appears well-developed and well-nourished. No distress.   HENT:   Head: Normocephalic and atraumatic.   Mouth/Throat: No oropharyngeal exudate.   Eyes: EOM are normal. Pupils are equal, round, and reactive to light. No scleral icterus.   Neck: Normal range of motion. Neck supple.   Cardiovascular: Normal rate and regular rhythm.   Pulmonary/Chest: Effort normal and breath sounds normal.   Abdominal: Soft. She exhibits no mass. There is no tenderness. There is no rebound and no guarding.   No CVA tenderness to percussion  There is suprapubic tenderness to palpation and does reproduce the sense to urinate   Neurological: She is alert and oriented to person, place, and time.   Skin: Skin is dry. Rash noted. She is not diaphoretic.   Dry skin with escoriations on the posterior thoracic chest wall over the left and right scapular regions.     Psychiatric: She has a normal mood and affect.       Lab:  Recent Results (from the past 72 hour(s))   Urinalysis   Result Value Ref Range    Color, UA Yellow Colorless, Yellow, Straw, Light Yellow    Clarity, UA Clear Clear    Glucose, UA Negative Negative    Bilirubin, UA Small (!) Negative    Ketones, UA 15 mg/dL (!) Negative    Specific Gravity, UA 1.020 1.005 - 1.030    Blood, UA Negative Negative    pH, UA 6.0 5.0 - 8.0    Protein, UA 30 mg/dL (!) Negative mg/dL    Urobilinogen, UA 0.2 E.U./dL 0.2 E.U./dL, 1.0 E.U./dL    Nitrite, UA Negative Negative    Leukocytes, UA Small  (!) Negative       Assessment:     Procedures    The primary encounter diagnosis was Dysuria. A diagnosis of Urinary tract infection without hematuria, site unspecified was also pertinent to this visit.    Plan:      1. Dysuria  Urinalysis   2. Urinary tract infection without hematuria, site unspecified  cephalexin (KEFLEX) 500 MG capsule         Patient Instructions     Increased fluids and rest.  Discussed signs and symptoms of ascending urinary tract infection symptoms to include pyelonephritis. Instructed to turn to clinic if there are increased fever chills night sweats fatigue abdominal pain or flank pain  Antibiotic as written. Risks and benefits of medication discussed.  Indication for return to clinic.    As a result of our visit today, here are the action plans for you:    1. Medication(s) to stop: There are no discontinued medications.    2. Medication(s) to start or change:   Medications Ordered   Medications   ? cephalexin (KEFLEX) 500 MG capsule     Sig: Take 1 capsule (500 mg total) by mouth 2 (two) times a day for 10 days. For UTI.     Dispense:  20 capsule     Refill:  0       3. Other instructions: Yes      Urinary Tract Infections in Women    Urinary tract infections (UTIs) are most often caused by bacteria (germs). These bacteria enter the urinary tract. The bacteria may come from outside the body. Or they may travel from the skin outside the rectum or vagina into the urethra. Female anatomy makes it easier for bacteria from the bowel to enter a womans urinary tract, which is the most common source of UTI. This means women develop UTIs more often than men. Pain in or around the urinary tract is a common UTI symptom. But the only way to know for sure if you have a UTI for the health care provider to test your urine. The two tests that may be done are the urinalysis and urine culture.  Types of UTIs    Cystitis: A bladder infection (cystitis) is the most common UTI in women. You may have urgent or  frequent urination. You may also have pain, burning when you urinate, and bloody urine.    Urethritis: This is an inflamed urethra, which is the tube that carries urine from the bladder to outside the body. You may have lower stomach or back pain. You may also have urgent or frequent urination.    Pyelonephritis: This is a kidney infection. If not treated, it can be serious and damage your kidneys. In severe cases, you may be hospitalized. You may have a fever and lower back pain.  Medications to treat a UTI  Most UTIs are treated with antibiotics. These kill the bacteria. The length of time you need to take them depends on the type of infection. It may be as short as 3 days. If you have repeated UTIs, a low-dose antibiotic may be needed for several months. Take antibiotics exactly as directed. Dont stop taking them until all of the medication is gone. If you stop taking the antibiotic too soon, the infection may not go away, and you may develop a resistance to the antibiotic. This can make it much harder to treat.  Lifestyle changes to treat and prevent UTIs  The lifestyle changes below will help get rid of your UTI. They may also help prevent future UTIs.    Drink plenty of fluids. This includes water, juice, or other caffeine-free drinks. Fluids help flush bacteria out of your body.    Empty your bladder. Always empty your bladder when you feel the urge to urinate. And always urinate before going to sleep. Urine that stays in your bladder can lead to infection. Try to urinate before and after sex as well.    Practice good personal hygiene. Wipe yourself from front to back after using the toilet. This helps keep bacteria from getting into the urethra.    Use condoms during sex. These help prevent UTIs caused by sexually transmitted bacteria. Also, avoid using spermicides during sex. These can increase the risk of UTIs. Choose other forms of birth control instead. For women who tend to get UTIs after sex, a  low-dose of a preventive antibiotic may be used. Be sure to discuss this option with your health care provider.    Follow up with your health care provider as directed. He or she may test to make sure the infection has cleared. If necessary, additional treatment may be started.  Date Last Reviewed: 9/8/2014 2000-2016 [employer]. [address] [place] [address], [place] [address]. All rights reserved. This information is not intended as a substitute for professional medical care. Always follow your healthcare professional's instructions.

## 2023-12-16 DIAGNOSIS — E03.9 ACQUIRED HYPOTHYROIDISM: ICD-10-CM

## 2023-12-18 RX ORDER — THYROID,PORK 90 MG
TABLET ORAL
Qty: 90 TABLET | Refills: 0 | Status: SHIPPED | OUTPATIENT
Start: 2023-12-18

## 2023-12-21 ENCOUNTER — PATIENT MESSAGE (OUTPATIENT)
Dept: FAMILY MEDICINE CLINIC | Facility: CLINIC | Age: 58
End: 2023-12-21
Payer: COMMERCIAL

## 2023-12-21 DIAGNOSIS — E11.9 TYPE 2 DIABETES MELLITUS WITHOUT COMPLICATION, WITHOUT LONG-TERM CURRENT USE OF INSULIN: Primary | ICD-10-CM

## 2023-12-21 NOTE — TELEPHONE ENCOUNTER
From: Zahira Ramirez  To: Kelvin Khan  Sent: 12/21/2023 12:23 PM EST  Subject: Mounjaro     Can we go up to the 10mg dose on Mounjaro now?

## 2024-01-09 DIAGNOSIS — E11.65 TYPE 2 DIABETES MELLITUS WITH HYPERGLYCEMIA, WITHOUT LONG-TERM CURRENT USE OF INSULIN: ICD-10-CM

## 2024-01-17 ENCOUNTER — TELEPHONE (OUTPATIENT)
Dept: FAMILY MEDICINE CLINIC | Facility: CLINIC | Age: 59
End: 2024-01-17
Payer: COMMERCIAL

## 2024-02-19 ENCOUNTER — LAB (OUTPATIENT)
Dept: LAB | Facility: HOSPITAL | Age: 59
End: 2024-02-19
Payer: COMMERCIAL

## 2024-02-19 DIAGNOSIS — E03.9 ACQUIRED HYPOTHYROIDISM: ICD-10-CM

## 2024-02-19 DIAGNOSIS — E55.9 VITAMIN D DEFICIENCY: ICD-10-CM

## 2024-02-19 DIAGNOSIS — E11.9 TYPE 2 DIABETES MELLITUS WITHOUT COMPLICATION, WITHOUT LONG-TERM CURRENT USE OF INSULIN: ICD-10-CM

## 2024-02-19 LAB
25(OH)D3 SERPL-MCNC: 47.6 NG/ML (ref 30–100)
ALBUMIN SERPL-MCNC: 4.1 G/DL (ref 3.5–5.2)
ALBUMIN UR-MCNC: <1.2 MG/DL
ALBUMIN/GLOB SERPL: 1.6 G/DL
ALP SERPL-CCNC: 80 U/L (ref 39–117)
ALT SERPL W P-5'-P-CCNC: 37 U/L (ref 1–33)
ANION GAP SERPL CALCULATED.3IONS-SCNC: 10.5 MMOL/L (ref 5–15)
AST SERPL-CCNC: 24 U/L (ref 1–32)
BASOPHILS # BLD AUTO: 0.06 10*3/MM3 (ref 0–0.2)
BASOPHILS NFR BLD AUTO: 0.9 % (ref 0–1.5)
BILIRUB SERPL-MCNC: 0.2 MG/DL (ref 0–1.2)
BILIRUB UR QL STRIP: NEGATIVE
BUN SERPL-MCNC: 11 MG/DL (ref 6–20)
BUN/CREAT SERPL: 12.5 (ref 7–25)
CALCIUM SPEC-SCNC: 8.9 MG/DL (ref 8.6–10.5)
CHLORIDE SERPL-SCNC: 103 MMOL/L (ref 98–107)
CHOLEST SERPL-MCNC: 190 MG/DL (ref 0–200)
CLARITY UR: CLEAR
CO2 SERPL-SCNC: 26.5 MMOL/L (ref 22–29)
COLOR UR: YELLOW
CREAT SERPL-MCNC: 0.88 MG/DL (ref 0.57–1)
CREAT UR-MCNC: 160.6 MG/DL
DEPRECATED RDW RBC AUTO: 39.5 FL (ref 37–54)
EGFRCR SERPLBLD CKD-EPI 2021: 77.2 ML/MIN/1.73
EOSINOPHIL # BLD AUTO: 0.18 10*3/MM3 (ref 0–0.4)
EOSINOPHIL NFR BLD AUTO: 2.8 % (ref 0.3–6.2)
ERYTHROCYTE [DISTWIDTH] IN BLOOD BY AUTOMATED COUNT: 12.9 % (ref 12.3–15.4)
GLOBULIN UR ELPH-MCNC: 2.6 GM/DL
GLUCOSE SERPL-MCNC: 125 MG/DL (ref 65–99)
GLUCOSE UR STRIP-MCNC: NEGATIVE MG/DL
HBA1C MFR BLD: 5.3 % (ref 4.8–5.6)
HCT VFR BLD AUTO: 40.1 % (ref 34–46.6)
HDLC SERPL-MCNC: 37 MG/DL (ref 40–60)
HGB BLD-MCNC: 13.4 G/DL (ref 12–15.9)
HGB UR QL STRIP.AUTO: NEGATIVE
HOLD SPECIMEN: NORMAL
IMM GRANULOCYTES # BLD AUTO: 0.02 10*3/MM3 (ref 0–0.05)
IMM GRANULOCYTES NFR BLD AUTO: 0.3 % (ref 0–0.5)
KETONES UR QL STRIP: NEGATIVE
LDLC SERPL CALC-MCNC: 132 MG/DL (ref 0–100)
LDLC/HDLC SERPL: 3.5 {RATIO}
LEUKOCYTE ESTERASE UR QL STRIP.AUTO: NEGATIVE
LYMPHOCYTES # BLD AUTO: 1.86 10*3/MM3 (ref 0.7–3.1)
LYMPHOCYTES NFR BLD AUTO: 28.7 % (ref 19.6–45.3)
MCH RBC QN AUTO: 28.3 PG (ref 26.6–33)
MCHC RBC AUTO-ENTMCNC: 33.4 G/DL (ref 31.5–35.7)
MCV RBC AUTO: 84.6 FL (ref 79–97)
MICROALBUMIN/CREAT UR: NORMAL MG/G{CREAT}
MONOCYTES # BLD AUTO: 0.61 10*3/MM3 (ref 0.1–0.9)
MONOCYTES NFR BLD AUTO: 9.4 % (ref 5–12)
NEUTROPHILS NFR BLD AUTO: 3.75 10*3/MM3 (ref 1.7–7)
NEUTROPHILS NFR BLD AUTO: 57.9 % (ref 42.7–76)
NITRITE UR QL STRIP: NEGATIVE
NRBC BLD AUTO-RTO: 0 /100 WBC (ref 0–0.2)
PH UR STRIP.AUTO: 5.5 [PH] (ref 5–8)
PLATELET # BLD AUTO: 274 10*3/MM3 (ref 140–450)
PMV BLD AUTO: 10.2 FL (ref 6–12)
POTASSIUM SERPL-SCNC: 3.9 MMOL/L (ref 3.5–5.2)
PROT SERPL-MCNC: 6.7 G/DL (ref 6–8.5)
PROT UR QL STRIP: NEGATIVE
RBC # BLD AUTO: 4.74 10*6/MM3 (ref 3.77–5.28)
SODIUM SERPL-SCNC: 140 MMOL/L (ref 136–145)
SP GR UR STRIP: 1.02 (ref 1–1.03)
TRIGL SERPL-MCNC: 118 MG/DL (ref 0–150)
TSH SERPL DL<=0.05 MIU/L-ACNC: 1.01 UIU/ML (ref 0.27–4.2)
UROBILINOGEN UR QL STRIP: NORMAL
VLDLC SERPL-MCNC: 21 MG/DL (ref 5–40)
WBC NRBC COR # BLD AUTO: 6.48 10*3/MM3 (ref 3.4–10.8)

## 2024-02-19 PROCEDURE — 82570 ASSAY OF URINE CREATININE: CPT

## 2024-02-19 PROCEDURE — 80061 LIPID PANEL: CPT

## 2024-02-19 PROCEDURE — 85025 COMPLETE CBC W/AUTO DIFF WBC: CPT

## 2024-02-19 PROCEDURE — 81003 URINALYSIS AUTO W/O SCOPE: CPT

## 2024-02-19 PROCEDURE — 83036 HEMOGLOBIN GLYCOSYLATED A1C: CPT

## 2024-02-19 PROCEDURE — 84443 ASSAY THYROID STIM HORMONE: CPT

## 2024-02-19 PROCEDURE — 82043 UR ALBUMIN QUANTITATIVE: CPT

## 2024-02-19 PROCEDURE — 36415 COLL VENOUS BLD VENIPUNCTURE: CPT

## 2024-02-19 PROCEDURE — 82306 VITAMIN D 25 HYDROXY: CPT

## 2024-02-19 PROCEDURE — 80053 COMPREHEN METABOLIC PANEL: CPT

## 2024-02-26 ENCOUNTER — HOSPITAL ENCOUNTER (EMERGENCY)
Facility: HOSPITAL | Age: 59
Discharge: HOME | End: 2024-02-26
Attending: EMERGENCY MEDICINE | Admitting: EMERGENCY MEDICINE
Payer: COMMERCIAL

## 2024-02-26 VITALS
DIASTOLIC BLOOD PRESSURE: 88 MMHG | OXYGEN SATURATION: 98 % | HEART RATE: 82 BPM | RESPIRATION RATE: 18 BRPM | SYSTOLIC BLOOD PRESSURE: 124 MMHG | BODY MASS INDEX: 41.41 KG/M2 | TEMPERATURE: 98.9 F | WEIGHT: 233.69 LBS | HEIGHT: 63 IN

## 2024-02-26 DIAGNOSIS — N39.0 ACUTE UTI: Primary | ICD-10-CM

## 2024-02-26 LAB
ALBUMIN SERPL-MCNC: 4.5 G/DL (ref 3.5–5.2)
ALBUMIN/GLOB SERPL: 1.4 G/DL
ALP SERPL-CCNC: 82 U/L (ref 39–117)
ALT SERPL W P-5'-P-CCNC: 49 U/L (ref 1–33)
ANION GAP SERPL CALCULATED.3IONS-SCNC: 11.8 MMOL/L (ref 5–15)
AST SERPL-CCNC: 25 U/L (ref 1–32)
BACTERIA UR QL AUTO: ABNORMAL /HPF
BASOPHILS # BLD AUTO: 0.09 10*3/MM3 (ref 0–0.2)
BASOPHILS NFR BLD AUTO: 1.2 % (ref 0–1.5)
BILIRUB SERPL-MCNC: 0.3 MG/DL (ref 0–1.2)
BILIRUB UR QL STRIP: NEGATIVE
BUN SERPL-MCNC: 9 MG/DL (ref 6–20)
BUN/CREAT SERPL: 13.4 (ref 7–25)
CALCIUM SPEC-SCNC: 9.4 MG/DL (ref 8.6–10.5)
CHLORIDE SERPL-SCNC: 104 MMOL/L (ref 98–107)
CLARITY UR: ABNORMAL
CO2 SERPL-SCNC: 26.2 MMOL/L (ref 22–29)
COLOR UR: YELLOW
CREAT SERPL-MCNC: 0.67 MG/DL (ref 0.57–1)
DEPRECATED RDW RBC AUTO: 39.8 FL (ref 37–54)
EGFRCR SERPLBLD CKD-EPI 2021: 102.7 ML/MIN/1.73
EOSINOPHIL # BLD AUTO: 0.08 10*3/MM3 (ref 0–0.4)
EOSINOPHIL NFR BLD AUTO: 1 % (ref 0.3–6.2)
ERYTHROCYTE [DISTWIDTH] IN BLOOD BY AUTOMATED COUNT: 13.1 % (ref 12.3–15.4)
GLOBULIN UR ELPH-MCNC: 3.2 GM/DL
GLUCOSE SERPL-MCNC: 107 MG/DL (ref 65–99)
GLUCOSE UR STRIP-MCNC: NEGATIVE MG/DL
HCT VFR BLD AUTO: 44.2 % (ref 34–46.6)
HGB BLD-MCNC: 14.7 G/DL (ref 12–15.9)
HGB UR QL STRIP.AUTO: NEGATIVE
HYALINE CASTS UR QL AUTO: ABNORMAL /LPF
IMM GRANULOCYTES # BLD AUTO: 0.04 10*3/MM3 (ref 0–0.05)
IMM GRANULOCYTES NFR BLD AUTO: 0.5 % (ref 0–0.5)
KETONES UR QL STRIP: NEGATIVE
LEUKOCYTE ESTERASE UR QL STRIP.AUTO: ABNORMAL
LYMPHOCYTES # BLD AUTO: 1.99 10*3/MM3 (ref 0.7–3.1)
LYMPHOCYTES NFR BLD AUTO: 26 % (ref 19.6–45.3)
MCH RBC QN AUTO: 28.1 PG (ref 26.6–33)
MCHC RBC AUTO-ENTMCNC: 33.3 G/DL (ref 31.5–35.7)
MCV RBC AUTO: 84.5 FL (ref 79–97)
MONOCYTES # BLD AUTO: 0.57 10*3/MM3 (ref 0.1–0.9)
MONOCYTES NFR BLD AUTO: 7.4 % (ref 5–12)
NEUTROPHILS NFR BLD AUTO: 4.89 10*3/MM3 (ref 1.7–7)
NEUTROPHILS NFR BLD AUTO: 63.9 % (ref 42.7–76)
NITRITE UR QL STRIP: NEGATIVE
NRBC BLD AUTO-RTO: 0 /100 WBC (ref 0–0.2)
PH UR STRIP.AUTO: 5.5 [PH] (ref 5–8)
PLATELET # BLD AUTO: 308 10*3/MM3 (ref 140–450)
PMV BLD AUTO: 9.7 FL (ref 6–12)
POTASSIUM SERPL-SCNC: 3.9 MMOL/L (ref 3.5–5.2)
PROT SERPL-MCNC: 7.7 G/DL (ref 6–8.5)
PROT UR QL STRIP: NEGATIVE
QT INTERVAL: 415 MS
QTC INTERVAL: 463 MS
RBC # BLD AUTO: 5.23 10*6/MM3 (ref 3.77–5.28)
RBC # UR STRIP: ABNORMAL /HPF
REF LAB TEST METHOD: ABNORMAL
SODIUM SERPL-SCNC: 142 MMOL/L (ref 136–145)
SP GR UR STRIP: 1.01 (ref 1–1.03)
SQUAMOUS #/AREA URNS HPF: ABNORMAL /HPF
UROBILINOGEN UR QL STRIP: ABNORMAL
WBC # UR STRIP: ABNORMAL /HPF
WBC NRBC COR # BLD AUTO: 7.66 10*3/MM3 (ref 3.4–10.8)

## 2024-02-26 PROCEDURE — 93005 ELECTROCARDIOGRAM TRACING: CPT

## 2024-02-26 PROCEDURE — 93005 ELECTROCARDIOGRAM TRACING: CPT | Performed by: EMERGENCY MEDICINE

## 2024-02-26 PROCEDURE — 36415 COLL VENOUS BLD VENIPUNCTURE: CPT

## 2024-02-26 PROCEDURE — 87086 URINE CULTURE/COLONY COUNT: CPT | Performed by: EMERGENCY MEDICINE

## 2024-02-26 PROCEDURE — 81001 URINALYSIS AUTO W/SCOPE: CPT | Performed by: EMERGENCY MEDICINE

## 2024-02-26 PROCEDURE — 96374 THER/PROPH/DIAG INJ IV PUSH: CPT

## 2024-02-26 PROCEDURE — 25010000002 KETOROLAC TROMETHAMINE PER 15 MG: Performed by: EMERGENCY MEDICINE

## 2024-02-26 PROCEDURE — 99283 EMERGENCY DEPT VISIT LOW MDM: CPT

## 2024-02-26 PROCEDURE — 85025 COMPLETE CBC W/AUTO DIFF WBC: CPT | Performed by: EMERGENCY MEDICINE

## 2024-02-26 PROCEDURE — 25810000003 SODIUM CHLORIDE 0.9 % SOLUTION: Performed by: EMERGENCY MEDICINE

## 2024-02-26 PROCEDURE — 80053 COMPREHEN METABOLIC PANEL: CPT | Performed by: EMERGENCY MEDICINE

## 2024-02-26 RX ORDER — NITROFURANTOIN 25; 75 MG/1; MG/1
100 CAPSULE ORAL 2 TIMES DAILY
Qty: 10 CAPSULE | Refills: 0 | Status: SHIPPED | OUTPATIENT
Start: 2024-02-26

## 2024-02-26 RX ORDER — KETOROLAC TROMETHAMINE 30 MG/ML
30 INJECTION, SOLUTION INTRAMUSCULAR; INTRAVENOUS ONCE
Status: COMPLETED | OUTPATIENT
Start: 2024-02-26 | End: 2024-02-26

## 2024-02-26 RX ADMIN — SODIUM CHLORIDE 1000 ML: 9 INJECTION, SOLUTION INTRAVENOUS at 13:39

## 2024-02-26 RX ADMIN — KETOROLAC TROMETHAMINE 30 MG: 30 INJECTION, SOLUTION INTRAMUSCULAR; INTRAVENOUS at 13:40

## 2024-02-26 NOTE — ED PROVIDER NOTES
"Time: 2:24 PM EST  Date of encounter:  2024  Independent Historian/Clinical History and Information was obtained by:   Patient    History is limited by: N/A    Chief Complaint: Sore throat      History of Present Illness:  Patient is a 58 y.o. year old female who presents to the emergency department for evaluation of fever, chills, sore throat that started yesterday.  Patient reports that she popped a \"blood blister\"  earlier today.  Patient denies chest pain and shortness of breath.  Patient has no cough or hemoptysis.  Patient denies dysuria.  Patient denies nausea, vomiting, and diarrhea.    HPI    Patient Care Team  Primary Care Provider: Kelvin Khan APRN    Past Medical History:     No Known Allergies  Past Medical History:   Diagnosis Date    Anemia     Broken bones     Diabetes mellitus     Type 2    Endometriosis     Forgetfulness     Heart murmur  this year    Had a big flutter and felt like i was gonna pass out.    Hyperlipemia     Hypothyroidism     Obesity     Paroxysmal SVT/Atrial Tachycardia 10/16/2022    Pneumonia Early July    Stroke      Past Surgical History:   Procedure Laterality Date    ABDOMINAL HYSTERECTOMY      CARPAL TUNNEL RELEASE       SECTION      ENDOMETRIAL ABLATION      LASER ABLATION      OTHER SURGICAL HISTORY      Laparotomy    TUBAL ABDOMINAL LIGATION       Family History   Problem Relation Age of Onset    Cervical cancer Mother         Malignant    Aneurysm Mother     Diabetes Mother     Stroke Other     Heart disease Other     Uterine cancer Other     Heart disease Father     Hypertension Father     Stroke Paternal Grandmother        Home Medications:  Prior to Admission medications    Medication Sig Start Date End Date Taking? Authorizing Provider   Accu-Chek Guide test strip USE TO TEST ONCE DAILY 10/20/21   Kelvin Khan APRN Armour Thyroid 90 MG tablet TAKE 1 TABLET BY MOUTH DAILY 23   Kelvin Khan APRN   metFORMIN (GLUCOPHAGE) 500 MG tablet " "TAKE 1 TABLET BY MOUTH TWICE DAILY WITH MEALS 1/9/24   Kelvin Khan APRN   nitrofurantoin, macrocrystal-monohydrate, (MACROBID) 100 MG capsule Take 1 capsule by mouth 2 (Two) Times a Day. 2/26/24   Germaine Vazquez MD   Tirzepatide (MOUNJARO) 10 MG/0.5ML solution pen-injector pen Inject 0.5 mL under the skin into the appropriate area as directed 1 (One) Time Per Week. 12/21/23   Kelvin Khan APRN   vitamin D (ERGOCALCIFEROL) 1.25 MG (75217 UT) capsule capsule Take 1 capsule by mouth Every 7 (Seven) Days. 9/11/23   Kelvin Khan APRN        Social History:   Social History     Tobacco Use    Smoking status: Former     Packs/day: 1     Types: Cigarettes    Smokeless tobacco: Never    Tobacco comments:     quit at age 42. second hand smoke exposure-never   Vaping Use    Vaping Use: Never used   Substance Use Topics    Alcohol use: Yes     Alcohol/week: 1.0 standard drink of alcohol     Types: 1 Glasses of wine per week     Comment: drinks rarely; wine    Drug use: Never         Review of Systems:  Review of Systems   Constitutional:  Negative for chills and fever.   HENT:  Negative for congestion, rhinorrhea and sore throat.    Eyes:  Negative for pain and visual disturbance.   Respiratory:  Negative for apnea, cough, chest tightness and shortness of breath.    Cardiovascular:  Negative for chest pain and palpitations.   Gastrointestinal:  Negative for abdominal pain, diarrhea, nausea and vomiting.   Genitourinary:  Negative for difficulty urinating and dysuria.   Musculoskeletal:  Negative for joint swelling and myalgias.   Skin:  Negative for color change.   Neurological:  Negative for seizures and headaches.   Psychiatric/Behavioral: Negative.     All other systems reviewed and are negative.       Physical Exam:  /88   Pulse 82   Temp 98.9 °F (37.2 °C) (Oral)   Resp 18   Ht 160 cm (63\")   Wt 106 kg (233 lb 11 oz)   SpO2 98%   BMI 41.40 kg/m²     Physical Exam  Vitals and nursing note " reviewed.   Constitutional:       General: She is not in acute distress.     Appearance: Normal appearance. She is not toxic-appearing.   HENT:      Head: Normocephalic and atraumatic.      Jaw: There is normal jaw occlusion.   Eyes:      General: Lids are normal.      Extraocular Movements: Extraocular movements intact.      Conjunctiva/sclera: Conjunctivae normal.      Pupils: Pupils are equal, round, and reactive to light.   Cardiovascular:      Rate and Rhythm: Normal rate and regular rhythm.      Pulses: Normal pulses.      Heart sounds: Normal heart sounds.   Pulmonary:      Effort: Pulmonary effort is normal. No respiratory distress.      Breath sounds: Normal breath sounds. No wheezing or rhonchi.   Abdominal:      General: Abdomen is flat.      Palpations: Abdomen is soft.      Tenderness: There is no abdominal tenderness. There is no guarding or rebound.   Musculoskeletal:         General: Normal range of motion.      Cervical back: Normal range of motion and neck supple.      Right lower leg: No edema.      Left lower leg: No edema.   Skin:     General: Skin is warm and dry.   Neurological:      Mental Status: She is alert and oriented to person, place, and time. Mental status is at baseline.   Psychiatric:         Mood and Affect: Mood normal.                  Procedures:  Procedures      Medical Decision Making:      Comorbidities that affect care:    Diabetes    External Notes reviewed:    Previous Clinic Note: Patient was seen in clinic for follow-up of medication.      The following orders were placed and all results were independently analyzed by me:  Orders Placed This Encounter   Procedures    Rapid Strep A Screen - Swab, Throat    COVID PRE-OP / PRE-PROCEDURE SCREENING ORDER (NO ISOLATION) - Swab, Nasopharynx    COVID-19, FLU A/B, RSV PCR 1 HR TAT - Swab, Nasopharynx    Urine Culture - Urine,    Urinalysis With Culture If Indicated - Urine, Clean Catch    Comprehensive Metabolic Panel    CBC Auto  Differential    Urinalysis, Microscopic Only - Urine, Clean Catch    ECG 12 Lead Other; Abnormal EKG    CBC & Differential       Medications Given in the Emergency Department:  Medications   sodium chloride 0.9 % bolus 1,000 mL (1,000 mL Intravenous New Bag 2/26/24 1339)   ketorolac (TORADOL) injection 30 mg (30 mg Intravenous Given 2/26/24 1340)        ED Course:         Labs:    Lab Results (last 24 hours)       Procedure Component Value Units Date/Time    Urinalysis With Culture If Indicated - Urine, Clean Catch [[id number]]  (Abnormal) Collected: 02/26/24 1223    Specimen: Urine, Clean Catch Updated: 02/26/24 1248     Color, UA Yellow     Appearance, UA Cloudy     pH, UA 5.5     Specific Gravity, UA 1.012     Glucose, UA Negative     Ketones, UA Negative     Bilirubin, UA Negative     Blood, UA Negative     Protein, UA Negative     Leuk Esterase, UA Trace     Nitrite, UA Negative     Urobilinogen, UA 0.2 E.U./dL    Narrative:      In absence of clinical symptoms, the presence of pyuria, bacteria, and/or nitrites on the urinalysis result does not correlate with infection.    Urinalysis, Microscopic Only - Urine, Clean Catch [[id number]]  (Abnormal) Collected: 02/26/24 1223    Specimen: Urine, Clean Catch Updated: 02/26/24 1248     RBC, UA 0-2 /HPF      WBC, UA 6-10 /HPF      Bacteria, UA 1+ /HPF      Squamous Epithelial Cells, UA 13-20 /HPF      Hyaline Casts, UA 0-2 /LPF      Methodology Automated Microscopy    Urine Culture - Urine, Urine, Clean Catch [[id number]] Collected: 02/26/24 1223    Specimen: Urine, Clean Catch Updated: 02/26/24 1248    CBC & Differential [[id number]]  (Normal) Collected: 02/26/24 1339    Specimen: Blood Updated: 02/26/24 1347    Narrative:      The following orders were created for panel order CBC & Differential.  Procedure                               Abnormality         Status                     ---------                               -----------         ------                     CBC  Auto Differential[[id number]]        Normal              Final result                 Please view results for these tests on the individual orders.    Comprehensive Metabolic Panel [[id number]]  (Abnormal) Collected: 02/26/24 1339    Specimen: Blood Updated: 02/26/24 1403     Glucose 107 mg/dL      BUN 9 mg/dL      Creatinine 0.67 mg/dL      Sodium 142 mmol/L      Potassium 3.9 mmol/L      Chloride 104 mmol/L      CO2 26.2 mmol/L      Calcium 9.4 mg/dL      Total Protein 7.7 g/dL      Albumin 4.5 g/dL      ALT (SGPT) 49 U/L      AST (SGOT) 25 U/L      Alkaline Phosphatase 82 U/L      Total Bilirubin 0.3 mg/dL      Globulin 3.2 gm/dL      A/G Ratio 1.4 g/dL      BUN/Creatinine Ratio 13.4     Anion Gap 11.8 mmol/L      eGFR 102.7 mL/min/1.73     Narrative:      GFR Normal >60  Chronic Kidney Disease <60  Kidney Failure <15      CBC Auto Differential [[id number]]  (Normal) Collected: 02/26/24 1339    Specimen: Blood Updated: 02/26/24 1347     WBC 7.66 10*3/mm3      RBC 5.23 10*6/mm3      Hemoglobin 14.7 g/dL      Hematocrit 44.2 %      MCV 84.5 fL      MCH 28.1 pg      MCHC 33.3 g/dL      RDW 13.1 %      RDW-SD 39.8 fl      MPV 9.7 fL      Platelets 308 10*3/mm3      Neutrophil % 63.9 %      Lymphocyte % 26.0 %      Monocyte % 7.4 %      Eosinophil % 1.0 %      Basophil % 1.2 %      Immature Grans % 0.5 %      Neutrophils, Absolute 4.89 10*3/mm3      Lymphocytes, Absolute 1.99 10*3/mm3      Monocytes, Absolute 0.57 10*3/mm3      Eosinophils, Absolute 0.08 10*3/mm3      Basophils, Absolute 0.09 10*3/mm3      Immature Grans, Absolute 0.04 10*3/mm3      nRBC 0.0 /100 WBC              Imaging:    No Radiology Exams Resulted Within Past 24 Hours      Differential Diagnosis and Discussion:    Sore Throat: Differential diagnosis includes but is not limited to bacterial infection, viral infection, inhaled irritants, sinus drainage, thyroiditis, epiglottitis, and retropharyngeal abscess.    All labs were reviewed and interpreted  by me.    MDM     Amount and/or Complexity of Data Reviewed  Clinical lab tests: reviewed  Tests in the medicine section of CPT®: reviewed    The patient´s CBC that was reviewed and interpreted by me shows no abnormalities of critical concern. Of note, there is no anemia requiring a blood transfusion and the platelet count is acceptable.  The patient´s CMP that was reviewed and interpretted by me shows no abnormalities of critical concern. Of note, the patient´s sodium and potassium are acceptable. The patient´s liver enzymes are unremarkable. The patient´s renal function (creatinine) is preserved. The patient has a normal anion gap.  Patient does not want viral swabs as she states that they were negative at the urgent care.  Patient also does not want a rapid strep.  Urinalysis does show bacteriuria.  Based on the results of the patient´s urinalysis and urinary complaints, signs, symptoms, and diagnostic testing is consistent with a urinary tract infection. Patient is resting comfortably, is alert, and is in no distress. The repeat examination is unremarkable and benign. The patient has no signs of urosepsis. The patient was started on antibiotics in the emergency department and will be discharged with antibiotics as an outpatient. The patient was counseled to return to the ER for fever >100.5, intractable pain or vomiting, or any other concerns that the may have. The patient has expressed a clear and thorough understanding and agreed to follow up as instructed.            Patient Care Considerations:    ANTIVIRAL: I considered prescribing antiviral medication as an outpatient, however viral swabs are negative.      Consultants/Shared Management Plan:    None    Social Determinants of Health:    Patient is independent, reliable, and has access to care.       Disposition and Care Coordination:    Discharged: I considered escalation of care by admitting this patient to the hospital, however the patient has improved  and is suitable and  stable for discharge.    I have explained the patient´s condition, diagnoses and treatment plan based on the information available to me at this time. I have answered questions and addressed any concerns. The patient has a good  understanding of the patient´s diagnosis, condition, and treatment plan as can be expected at this point. The vital signs have been stable. The patient´s condition is stable and appropriate for discharge from the emergency department.      The patient will pursue further outpatient evaluation with the primary care physician or other designated or consulting physician as outlined in the discharge instructions. They are agreeable to this plan of care and follow-up instructions have been explained in detail. The patient has received these instructions in written format and has expressed an understanding of the discharge instructions. The patient is aware that any significant change in condition or worsening of symptoms should prompt an immediate return to this or the closest emergency department or call to 911.  I have explained discharge medications and the need for follow up with the patient/caretakers. This was also printed in the discharge instructions. Patient was discharged with the following medications and follow up:      Medication List        New Prescriptions      nitrofurantoin (macrocrystal-monohydrate) 100 MG capsule  Commonly known as: MACROBID  Take 1 capsule by mouth 2 (Two) Times a Day.               Where to Get Your Medications        These medications were sent to [id number] - [place], [place] - [address] AT [facility] - [phone]  - [phone] FX  [address] [place] [address]      Phone: [phone]   nitrofurantoin (macrocrystal-monohydrate) 100 MG capsule      Kelvin Khan, APRN  [address]  [address]  [place] [id number] [phone]    In 2 days         Final diagnoses:   Acute  UTI        ED Disposition       ED Disposition   Discharge    Condition   Stable    Comment   --               This medical record created using voice recognition software.             Germaine Vazquez MD  02/26/24 [id number]

## 2024-02-26 NOTE — ED NOTES
Pt reports she was swabbed for covid, flu and strep at an  earlier and that they were negative, Pt states she does not want to be swabbed again by us.

## 2024-02-27 LAB — BACTERIA SPEC AEROBE CULT: NORMAL

## 2024-02-28 ENCOUNTER — TELEPHONE (OUTPATIENT)
Dept: FAMILY MEDICINE CLINIC | Facility: CLINIC | Age: 59
End: 2024-02-28
Payer: COMMERCIAL

## 2024-02-28 NOTE — TELEPHONE ENCOUNTER
Patient was seen in UC on 2/26 where she was advised to be seen in ER due to abnormal EKG. Per patient EKG in ER was normal, states they only found UTI. Patient is experiencing shortness of breath, lungs feel weird and fatigue. Patient states symptoms started Friday afternoon.    Advised patient to be seen back in UC or ER if worsening or no improvement. Patient does not want to be seen in ER or UC since she was already seen.

## 2024-02-29 ENCOUNTER — OFFICE VISIT (OUTPATIENT)
Dept: FAMILY MEDICINE CLINIC | Facility: CLINIC | Age: 59
End: 2024-02-29
Payer: COMMERCIAL

## 2024-02-29 VITALS
RESPIRATION RATE: 18 BRPM | HEART RATE: 84 BPM | SYSTOLIC BLOOD PRESSURE: 138 MMHG | TEMPERATURE: 97.3 F | BODY MASS INDEX: 42.88 KG/M2 | OXYGEN SATURATION: 99 % | DIASTOLIC BLOOD PRESSURE: 82 MMHG | WEIGHT: 242 LBS | HEIGHT: 63 IN

## 2024-02-29 DIAGNOSIS — E11.9 TYPE 2 DIABETES MELLITUS WITHOUT COMPLICATION, WITHOUT LONG-TERM CURRENT USE OF INSULIN: ICD-10-CM

## 2024-02-29 DIAGNOSIS — J06.9 UPPER RESPIRATORY TRACT INFECTION, UNSPECIFIED TYPE: Primary | ICD-10-CM

## 2024-02-29 DIAGNOSIS — R42 PERSISTENT POSTURAL-PERCEPTUAL DIZZINESS: ICD-10-CM

## 2024-02-29 DIAGNOSIS — E55.9 VITAMIN D DEFICIENCY: ICD-10-CM

## 2024-02-29 DIAGNOSIS — E03.9 ACQUIRED HYPOTHYROIDISM: ICD-10-CM

## 2024-02-29 RX ORDER — CEFDINIR 300 MG/1
300 CAPSULE ORAL 2 TIMES DAILY
Qty: 20 CAPSULE | Refills: 0 | Status: SHIPPED | OUTPATIENT
Start: 2024-02-29

## 2024-02-29 RX ORDER — MECLIZINE HYDROCHLORIDE 25 MG/1
25 TABLET ORAL 3 TIMES DAILY PRN
Qty: 60 TABLET | Refills: 0 | Status: SHIPPED | OUTPATIENT
Start: 2024-02-29

## 2024-02-29 NOTE — PROGRESS NOTES
Chief Complaint  Urinary Tract Infection (ED f/u  went to , [facility] ED and ), Constipation (Due to Mounjaro - stopped it and is doing fine), Fatigue, weight gain, Dizziness (Had extreme dizziness last Friday, short of breath, Monday went to the , panic attacks), Blister (A blood blister popped in the back of her throat), and Anxiety    Subjective        Zahira Ramirez presents to [facility]  History of Present Illness  Diabetes:  mounjaro caused fatigue, nausea, constipation.  She even gained weight.  Doing well on metformin.    Was seen in the ED and had shortness of breath. That she had a panic attack.  The episode this time started with dizziness with movement of head or changing positions.  Was seen in the ED and urgent care.    Has been going on for 3 weeks.  Had a blood blister that has popped in the back of her throat.  Started with shortness of breath.      Urinary tract infection and was placed on macrobid.  Diabetes  Pertinent negatives for hypoglycemia include no dizziness. Associated symptoms include fatigue. Pertinent negatives for diabetes include no chest pain and no weakness.   Hypothyroidism  Associated symptoms include fatigue. Pertinent negatives include no chest pain, coughing, fever, numbness or weakness.   Fatigue  Associated symptoms include fatigue. Pertinent negatives include no chest pain, coughing, fever, numbness or weakness.   Obesity  Associated symptoms include fatigue. Pertinent negatives include no chest pain, coughing, fever, numbness or weakness.       The following portions of the patient's history were personally reviewed and updated as appropriate: allergies, current medications, past medical history, past surgical history, past family history, and past social history.     Body mass index is 42.88 kg/m².    Class 3 Severe Obesity (BMI >=40). Obesity-related health conditions include the following: diabetes mellitus. Obesity is  unchanged. BMI is is above average; BMI management plan is completed. We discussed portion control and increasing exercise.      Past History:    Medical History: has a past medical history of Anemia, Broken bones, Diabetes mellitus, Endometriosis, Forgetfulness, Heart murmur ( this year), Hyperlipemia, Hypothyroidism, Obesity, Paroxysmal SVT/Atrial Tachycardia (10/16/2022), Pneumonia (Early July), and Stroke.     Surgical History: has a past surgical history that includes Laser ablation; Carpal tunnel release;  section; Abdominal hysterectomy; Other surgical history; Tubal ligation; and Endometrial ablation.     Family History: family history includes Aneurysm in her mother; Cervical cancer in her mother; Diabetes in her mother; Heart disease in her father and another family member; Hypertension in her father; Stroke in her paternal grandmother and another family member; Uterine cancer in an other family member.     Social History: reports that she has quit smoking. Her smoking use included cigarettes. She smoked an average of 1 pack per day. She has never used smokeless tobacco. She reports current alcohol use of about 1.0 standard drink of alcohol per week. She reports that she does not use drugs.    Allergies: Patient has no known allergies.          Current Outpatient Medications:     Accu-Chek Guide test strip, USE TO TEST ONCE DAILY, Disp: 100 each, Rfl: 1    [place] Thyroid 90 MG tablet, TAKE 1 TABLET BY MOUTH DAILY, Disp: 90 tablet, Rfl: 0    cefdinir (OMNICEF) 300 MG capsule, Take 1 capsule by mouth 2 (Two) Times a Day., Disp: 20 capsule, Rfl: 0    meclizine (ANTIVERT) 25 MG tablet, Take 1 tablet by mouth 3 (Three) Times a Day As Needed for Dizziness., Disp: 60 tablet, Rfl: 0    metFORMIN (GLUCOPHAGE) 500 MG tablet, TAKE 1 TABLET BY MOUTH TWICE DAILY WITH MEALS, Disp: 180 tablet, Rfl: 1    nitrofurantoin, macrocrystal-monohydrate, (MACROBID) 100 MG capsule, Take 1 capsule by mouth 2 (Two) Times  "a Day., Disp: 10 capsule, Rfl: 0    vitamin D (ERGOCALCIFEROL) 1.25 MG (56626 UT) capsule capsule, Take 1 capsule by mouth Every 7 (Seven) Days., Disp: 13 capsule, Rfl: 1    Medications Discontinued During This Encounter   Medication Reason    Tirzepatide (MOUNJARO) 10 MG/0.5ML solution pen-injector pen Other- See Medication Note         Review of Systems   Constitutional:  Positive for fatigue. Negative for fever.   Respiratory:  Negative for cough and shortness of breath.    Cardiovascular:  Negative for chest pain, palpitations and leg swelling.   Neurological:  Negative for dizziness, weakness, numbness and headache.        Objective         Vitals:    02/29/24 0754   BP: 138/82   BP Location: Right arm   Patient Position: Sitting   Cuff Size: Large Adult   Pulse: 84   Resp: 18   Temp: 97.3 °F (36.3 °C)   TempSrc: Temporal   SpO2: 99%   Weight: 110 kg (242 lb)   Height: 160 cm (62.99\")     Body mass index is 42.88 kg/m².         Physical Exam  Vitals reviewed.   Constitutional:       Appearance: Normal appearance. She is well-developed.   HENT:      Head: Normocephalic and atraumatic.      Right Ear: Tympanic membrane normal.      Left Ear: Tympanic membrane normal.      Mouth/Throat:      Pharynx: No oropharyngeal exudate or posterior oropharyngeal erythema.   Eyes:      Conjunctiva/sclera: Conjunctivae normal.      Pupils: Pupils are equal, round, and reactive to light.   Cardiovascular:      Rate and Rhythm: Normal rate and regular rhythm.      Heart sounds: Normal heart sounds. No murmur heard.     No friction rub. No gallop.   Pulmonary:      Effort: Pulmonary effort is normal.      Breath sounds: Normal breath sounds. No wheezing or rhonchi.   Abdominal:      General: Bowel sounds are normal.      Palpations: Abdomen is soft.   Skin:     General: Skin is warm and dry.   Neurological:      Mental Status: She is alert and oriented to person, place, and time.   Psychiatric:         Mood and Affect: Mood and " affect normal.         Behavior: Behavior normal.         Thought Content: Thought content normal.         Judgment: Judgment normal.             Result Review :               Assessment and Plan     Diagnoses and all orders for this visit:    1. Upper respiratory tract infection, unspecified type (Primary)  -     cefdinir (OMNICEF) 300 MG capsule; Take 1 capsule by mouth 2 (Two) Times a Day.  Dispense: 20 capsule; Refill: 0    2. Type 2 diabetes mellitus without complication, without long-term current use of insulin  -     CBC & Differential; Future  -     Comprehensive Metabolic Panel; Future  -     Hemoglobin A1c; Future  -     Urinalysis With Culture If Indicated -; Future  -     Lipid Panel; Future  -     Microalbumin / Creatinine Urine Ratio - Urine, Clean Catch; Future    3. Acquired hypothyroidism  -     TSH; Future    4. Vitamin D deficiency  -     Vitamin D,25-Hydroxy; Future    5. Persistent postural-perceptual dizziness  -     meclizine (ANTIVERT) 25 MG tablet; Take 1 tablet by mouth 3 (Three) Times a Day As Needed for Dizziness.  Dispense: 60 tablet; Refill: 0  -     US Carotid Bilateral; Future              Follow Up     Return for Be off work today. and tomorrow and return to work Monday.    Patient was given instructions and counseling regarding her condition or for health maintenance advice. Please see specific information pulled into the AVS if appropriate.

## 2024-03-01 DIAGNOSIS — R42 PERSISTENT POSTURAL-PERCEPTUAL DIZZINESS: Primary | ICD-10-CM

## 2024-03-06 LAB
QT INTERVAL: 415 MS
QTC INTERVAL: 463 MS

## 2024-03-07 ENCOUNTER — PATIENT MESSAGE (OUTPATIENT)
Dept: FAMILY MEDICINE CLINIC | Facility: CLINIC | Age: 59
End: 2024-03-07
Payer: COMMERCIAL

## 2024-03-07 NOTE — TELEPHONE ENCOUNTER
From: Zahira Ramirez  To: Kelvin Khan  Sent: 3/7/2024 6:10 AM EST  Subject: Rash around mouth     Would these antibiotics that were prescribed cause a rash around my mouth a week later? I've attached a picture.

## 2024-03-13 RX ORDER — ERGOCALCIFEROL 1.25 MG/1
50000 CAPSULE ORAL
Qty: 13 CAPSULE | Refills: 1 | Status: SHIPPED | OUTPATIENT
Start: 2024-03-13

## 2024-03-20 ENCOUNTER — OFFICE VISIT (OUTPATIENT)
Dept: FAMILY MEDICINE CLINIC | Facility: CLINIC | Age: 59
End: 2024-03-20
Payer: COMMERCIAL

## 2024-03-20 VITALS
TEMPERATURE: 97.1 F | RESPIRATION RATE: 18 BRPM | HEIGHT: 63 IN | DIASTOLIC BLOOD PRESSURE: 74 MMHG | WEIGHT: 239 LBS | HEART RATE: 98 BPM | BODY MASS INDEX: 42.35 KG/M2 | OXYGEN SATURATION: 97 % | SYSTOLIC BLOOD PRESSURE: 126 MMHG

## 2024-03-20 DIAGNOSIS — J02.9 PHARYNGITIS, UNSPECIFIED ETIOLOGY: ICD-10-CM

## 2024-03-20 DIAGNOSIS — R50.9 FEVER, UNSPECIFIED FEVER CAUSE: Primary | ICD-10-CM

## 2024-03-20 LAB
EXPIRATION DATE: NORMAL
FLUAV RNA RESP QL NAA+PROBE: NEGATIVE
FLUBV RNA RESP QL NAA+PROBE: NEGATIVE
INTERNAL CONTROL: NORMAL
Lab: NORMAL
S PYO AG THROAT QL: NEGATIVE
SARS-COV-2 AG UPPER RESP QL IA.RAPID: NOT DETECTED

## 2024-03-20 PROCEDURE — 87880 STREP A ASSAY W/OPTIC: CPT | Performed by: NURSE PRACTITIONER

## 2024-03-20 PROCEDURE — 87502 INFLUENZA DNA AMP PROBE: CPT | Performed by: NURSE PRACTITIONER

## 2024-03-20 PROCEDURE — 99213 OFFICE O/P EST LOW 20 MIN: CPT | Performed by: NURSE PRACTITIONER

## 2024-03-20 PROCEDURE — 87426 SARSCOV CORONAVIRUS AG IA: CPT | Performed by: NURSE PRACTITIONER

## 2024-03-20 RX ORDER — AMOXICILLIN AND CLAVULANATE POTASSIUM 875; 125 MG/1; MG/1
1 TABLET, FILM COATED ORAL
COMMUNITY
Start: 2024-03-18 | End: 2024-03-28

## 2024-03-20 RX ORDER — ALBUTEROL SULFATE 2.5 MG/3ML
2.5 SOLUTION RESPIRATORY (INHALATION)
COMMUNITY
Start: 2024-03-18 | End: 2024-04-17

## 2024-03-20 RX ORDER — PREDNISONE 20 MG/1
20 TABLET ORAL DAILY
COMMUNITY
Start: 2024-03-18 | End: 2024-03-23

## 2024-03-20 RX ORDER — BROMPHENIRAMINE MALEATE, PSEUDOEPHEDRINE HYDROCHLORIDE, AND DEXTROMETHORPHAN HYDROBROMIDE 2; 30; 10 MG/5ML; MG/5ML; MG/5ML
5 SYRUP ORAL
COMMUNITY
Start: 2024-03-18

## 2024-03-20 NOTE — PROGRESS NOTES
Chief Complaint  Cough, Nasal Congestion, Sore Throat, and Fatigue    Subjective        Zahira Ramirez presents to [facility]  History of Present Illness  Cough with nasal congestion, sore throat started a week ago.  Cough  Associated symptoms include a sore throat. Pertinent negatives include no chest pain, fever or shortness of breath.   Sore Throat   Associated symptoms include coughing. Pertinent negatives include no shortness of breath.   Fatigue  Associated symptoms include coughing, fatigue and a sore throat. Pertinent negatives include no chest pain, fever, numbness or weakness.       The following portions of the patient's history were personally reviewed and updated as appropriate: allergies, current medications, past medical history, past surgical history, past family history, and past social history.     Body mass index is 42.35 kg/m².           Past History:    Medical History: has a past medical history of Anemia, Broken bones, Diabetes mellitus, Endometriosis, Forgetfulness, Heart murmur ( this year), Hyperlipemia, Hypothyroidism, Obesity, Paroxysmal SVT/Atrial Tachycardia (10/16/2022), Pneumonia (Early July), and Stroke.     Surgical History: has a past surgical history that includes Laser ablation; Carpal tunnel release;  section; Abdominal hysterectomy; Other surgical history; Tubal ligation; and Endometrial ablation.     Family History: family history includes Aneurysm in her mother; Cervical cancer in her mother; Diabetes in her mother; Heart disease in her father and another family member; Hypertension in her father; Stroke in her paternal grandmother and another family member; Uterine cancer in an other family member.     Social History: reports that she has quit smoking. Her smoking use included cigarettes. She has never used smokeless tobacco. She reports current alcohol use of about 1.0 standard drink of alcohol per week. She reports  that she does not use drugs.    Allergies: Patient has no known allergies.          Current Outpatient Medications:     Accu-Chek Guide test strip, USE TO TEST ONCE DAILY, Disp: 100 each, Rfl: 1    albuterol (PROVENTIL) (2.5 MG/3ML) 0.083% nebulizer solution, Inhale 2.5 mg., Disp: , Rfl:     amoxicillin-clavulanate (AUGMENTIN) 875-125 MG per tablet, Take 1 tablet by mouth., Disp: , Rfl:     [place] Thyroid 90 MG tablet, TAKE 1 TABLET BY MOUTH DAILY, Disp: 90 tablet, Rfl: 0    meclizine (ANTIVERT) 25 MG tablet, Take 1 tablet by mouth 3 (Three) Times a Day As Needed for Dizziness., Disp: 60 tablet, Rfl: 0    metFORMIN (GLUCOPHAGE) 500 MG tablet, TAKE 1 TABLET BY MOUTH TWICE DAILY WITH MEALS, Disp: 180 tablet, Rfl: 1    predniSONE (DELTASONE) 20 MG tablet, Take 1 tablet by mouth Daily., Disp: , Rfl:     vitamin D (ERGOCALCIFEROL) 1.25 MG (48303 UT) capsule capsule, Take 1 capsule by mouth Every 7 (Seven) Days., Disp: 13 capsule, Rfl: 1    brompheniramine-pseudoephedrine-DM 30-2-10 MG/5ML syrup, Take 5 mL by mouth. (Patient not taking: Reported on 3/20/2024), Disp: , Rfl:     Medications Discontinued During This Encounter   Medication Reason    nitrofurantoin, macrocrystal-monohydrate, (MACROBID) 100 MG capsule *Therapy completed    cefdinir (OMNICEF) 300 MG capsule *Therapy completed    mupirocin (BACTROBAN) 2 % ointment *Therapy completed         Review of Systems   Constitutional:  Positive for fatigue. Negative for fever.   HENT:  Positive for sore throat.    Respiratory:  Positive for cough. Negative for shortness of breath.    Cardiovascular:  Negative for chest pain, palpitations and leg swelling.   Neurological:  Negative for dizziness, weakness, numbness and headache.        Objective         Vitals:    03/20/24 0709   BP: 126/74   BP Location: Right arm   Patient Position: Sitting   Cuff Size: Large Adult   Pulse: 98   Resp: 18   Temp: 97.1 °F (36.2 °C)   TempSrc: Temporal   SpO2: 97%   Weight: 108 kg (239 lb)  "  Height: 160 cm (62.99\")     Body mass index is 42.35 kg/m².         Physical Exam  Vitals reviewed.   Constitutional:       Appearance: Normal appearance. She is well-developed.   HENT:      Head: Normocephalic and atraumatic.      Mouth/Throat:      Pharynx: No oropharyngeal exudate.   Eyes:      Conjunctiva/sclera: Conjunctivae normal.      Pupils: Pupils are equal, round, and reactive to light.   Cardiovascular:      Rate and Rhythm: Normal rate and regular rhythm.      Heart sounds: Normal heart sounds. No murmur heard.     No friction rub. No gallop.   Pulmonary:      Effort: Pulmonary effort is normal.      Breath sounds: Normal breath sounds. No wheezing or rhonchi.   Skin:     General: Skin is warm and dry.   Neurological:      Mental Status: She is alert and oriented to person, place, and time.   Psychiatric:         Mood and Affect: Mood and affect normal.         Behavior: Behavior normal.         Thought Content: Thought content normal.         Judgment: Judgment normal.             Result Review :               Assessment and Plan     Diagnoses and all orders for this visit:    1. Fever, unspecified fever cause (Primary)  -     POCT Flu A&B, Molecular  -     POCT FELIZ SARS-CoV-2 Antigen ADRIANA    2. Pharyngitis, unspecified etiology  -     POC Rapid Strep A      Continue augmentin to finish.  Will let us know if sinus infection doesn't clear up.        Follow Up     Return for Next scheduled follow up.    Patient was given instructions and counseling regarding her condition or for health maintenance advice. Please see specific information pulled into the AVS if appropriate.         "

## 2024-03-23 DIAGNOSIS — E03.9 ACQUIRED HYPOTHYROIDISM: ICD-10-CM

## 2024-03-25 RX ORDER — THYROID,PORK 90 MG
TABLET ORAL
Qty: 90 TABLET | Refills: 0 | Status: SHIPPED | OUTPATIENT
Start: 2024-03-25 | End: 2024-03-26 | Stop reason: SDUPTHER

## 2024-03-26 DIAGNOSIS — E03.9 ACQUIRED HYPOTHYROIDISM: ICD-10-CM

## 2024-03-26 RX ORDER — THYROID 90 MG/1
90 TABLET ORAL DAILY
Qty: 90 TABLET | Refills: 0 | Status: SHIPPED | OUTPATIENT
Start: 2024-03-26

## 2024-04-10 ENCOUNTER — HOSPITAL ENCOUNTER (OUTPATIENT)
Dept: GENERAL RADIOLOGY | Facility: HOSPITAL | Age: 59
Discharge: HOME | End: 2024-04-10
Admitting: NURSE PRACTITIONER
Payer: COMMERCIAL

## 2024-04-10 ENCOUNTER — PATIENT MESSAGE (OUTPATIENT)
Dept: FAMILY MEDICINE CLINIC | Facility: CLINIC | Age: 59
End: 2024-04-10
Payer: COMMERCIAL

## 2024-04-10 DIAGNOSIS — R05.1 ACUTE COUGH: Primary | ICD-10-CM

## 2024-04-10 DIAGNOSIS — R05.1 ACUTE COUGH: ICD-10-CM

## 2024-04-10 PROCEDURE — 71046 X-RAY EXAM CHEST 2 VIEWS: CPT

## 2024-04-10 NOTE — TELEPHONE ENCOUNTER
From: Zahira Ramirez  To: Kelvin Khan  Sent: 4/10/2024 6:02 AM EDT  Subject: Testing?    I have saved screenshots from a friend of a good friend who was taking Mounjaro. I feel like this is what was, and is, happening to me. My question is... what kind of testing do I need to follow up on this and see if I have some kind of permanent damage from taking this medication?.. as I still wake up with some breathing issues and have continuing digestion issues. I'm just trying to figure out what is going on with me. Or if it's something as simple as allergies to cats. I have no idea what's going on and why I can't seem to get back to feeling well. I will make another appointment with you.

## 2024-04-26 ENCOUNTER — TELEPHONE (OUTPATIENT)
Dept: FAMILY MEDICINE CLINIC | Facility: CLINIC | Age: 59
End: 2024-04-26
Payer: COMMERCIAL

## 2024-05-20 ENCOUNTER — OFFICE VISIT (OUTPATIENT)
Dept: FAMILY MEDICINE CLINIC | Facility: CLINIC | Age: 59
End: 2024-05-20
Payer: COMMERCIAL

## 2024-05-20 VITALS
DIASTOLIC BLOOD PRESSURE: 64 MMHG | OXYGEN SATURATION: 98 % | HEIGHT: 63 IN | TEMPERATURE: 98.1 F | WEIGHT: 235.2 LBS | RESPIRATION RATE: 18 BRPM | HEART RATE: 85 BPM | BODY MASS INDEX: 41.67 KG/M2 | SYSTOLIC BLOOD PRESSURE: 116 MMHG

## 2024-05-20 DIAGNOSIS — R22.1 NECK FULLNESS: ICD-10-CM

## 2024-05-20 DIAGNOSIS — R19.06 EPIGASTRIC FULLNESS: Primary | ICD-10-CM

## 2024-05-20 DIAGNOSIS — J02.9 PHARYNGITIS, UNSPECIFIED ETIOLOGY: ICD-10-CM

## 2024-05-20 PROCEDURE — 99213 OFFICE O/P EST LOW 20 MIN: CPT | Performed by: NURSE PRACTITIONER

## 2024-05-20 RX ORDER — AZITHROMYCIN 250 MG/1
TABLET, FILM COATED ORAL
Qty: 6 TABLET | Refills: 0 | Status: SHIPPED | OUTPATIENT
Start: 2024-05-20

## 2024-05-20 NOTE — PROGRESS NOTES
Chief Complaint  Ear Fullness, Sore Throat, Fever (Last night 99.3), Edema, Sinusitis, Torticollis, trouble swallowing (Since being on the Mounjaro - feels like food is stuck), and Fatigue    Subjective        Zahira Ramirez presents to [facility]  History of Present Illness  Ear fullness with sore throat and a fever that started Saturday with fatigue with something stuck in throat and esophagus.  fatigue    States trouble swallowing feeling since started Mounjaro  and has continued after.  States that when gets back from vacation will make an appointment with gastro herself or will let us know who she wants to see and we will place a referral.  Started in January and stopped right after and feels like stomach still moving slowly.  Ear Fullness   Associated symptoms include a sore throat. Pertinent negatives include no coughing.   Sore Throat   Associated symptoms include a plugged ear sensation. Pertinent negatives include no coughing or shortness of breath.   Fever   Associated symptoms include a sore throat. Pertinent negatives include no chest pain or coughing.   Sinusitis  Associated symptoms include a sore throat. Pertinent negatives include no coughing or shortness of breath.   Torticollis  Associated symptoms include fatigue, a fever and a sore throat. Pertinent negatives include no chest pain, coughing, numbness or weakness.   Fatigue  Associated symptoms include fatigue, a fever and a sore throat. Pertinent negatives include no chest pain, coughing, numbness or weakness.       The following portions of the patient's history were personally reviewed and updated as appropriate: allergies, current medications, past medical history, past surgical history, past family history, and past social history.     Body mass index is 41.67 kg/m².           Past History:    Medical History: has a past medical history of Anemia, Broken bones, Diabetes mellitus, Endometriosis,  Forgetfulness, Heart murmur ( this year), Hyperlipemia, Hypothyroidism, Obesity, Paroxysmal SVT/Atrial Tachycardia (10/16/2022), Pneumonia (Early July), and Stroke.     Surgical History: has a past surgical history that includes Laser ablation; Carpal tunnel release;  section; Abdominal hysterectomy; Other surgical history; Tubal ligation; and Endometrial ablation.     Family History: family history includes Aneurysm in her mother; Cervical cancer in her mother; Diabetes in her mother; Heart disease in her father and another family member; Hypertension in her father; Stroke in her paternal grandmother and another family member; Uterine cancer in an other family member.     Social History: reports that she quit smoking about 17 years ago. Her smoking use included cigarettes. She started smoking about 43 years ago. She has a 39.5 pack-year smoking history. She has been exposed to tobacco smoke. She has never used smokeless tobacco. She reports that she does not currently use alcohol after a past usage of about 1.0 standard drink of alcohol per week. She reports that she does not use drugs.    Allergies: Patient has no known allergies.          Current Outpatient Medications:     Accu-Chek Guide test strip, USE TO TEST ONCE DAILY, Disp: 100 each, Rfl: 1    metFORMIN (GLUCOPHAGE) 500 MG tablet, TAKE 1 TABLET BY MOUTH TWICE DAILY WITH MEALS, Disp: 180 tablet, Rfl: 1    Thyroid ([place] THYROID) 90 MG PO tablet, Take 1 tablet by mouth Daily., Disp: 90 tablet, Rfl: 0    vitamin D (ERGOCALCIFEROL) 1.25 MG (44246 UT) capsule capsule, Take 1 capsule by mouth Every 7 (Seven) Days., Disp: 13 capsule, Rfl: 1    azithromycin (Zithromax Z-Donta) 250 MG tablet, Take 2 tablets by mouth on day 1, then 1 tablet daily on days 2-5, Disp: 6 tablet, Rfl: 0    Medications Discontinued During This Encounter   Medication Reason    meclizine (ANTIVERT) 25 MG tablet *Therapy completed    brompheniramine-pseudoephedrine-DM 30-2-10  "MG/5ML syrup *Therapy completed         Review of Systems   Constitutional:  Positive for fatigue and fever.   HENT:  Positive for sore throat.    Respiratory:  Negative for cough and shortness of breath.    Cardiovascular:  Negative for chest pain, palpitations and leg swelling.   Neurological:  Negative for dizziness, weakness, numbness and headache.        Objective         Vitals:    05/20/24 0938   BP: 116/64   BP Location: Right arm   Patient Position: Sitting   Cuff Size: Large Adult   Pulse: 85   Resp: 18   Temp: 98.1 °F (36.7 °C)   TempSrc: Temporal   SpO2: 98%   Weight: 107 kg (235 lb 3.2 oz)   Height: 160 cm (62.99\")     Body mass index is 41.67 kg/m².         Physical Exam  Vitals reviewed.   Constitutional:       Appearance: Normal appearance. She is well-developed.   HENT:      Head: Normocephalic and atraumatic.      Mouth/Throat:      Pharynx: No oropharyngeal exudate.   Eyes:      Conjunctiva/sclera: Conjunctivae normal.      Pupils: Pupils are equal, round, and reactive to light.   Cardiovascular:      Rate and Rhythm: Normal rate and regular rhythm.      Heart sounds: Normal heart sounds. No murmur heard.     No friction rub. No gallop.   Pulmonary:      Effort: Pulmonary effort is normal.      Breath sounds: Normal breath sounds. No wheezing or rhonchi.   Skin:     General: Skin is warm and dry.   Neurological:      Mental Status: She is alert and oriented to person, place, and time.   Psychiatric:         Mood and Affect: Mood and affect normal.         Behavior: Behavior normal.         Thought Content: Thought content normal.         Judgment: Judgment normal.             Result Review :               Assessment and Plan     Diagnoses and all orders for this visit:    1. Epigastric fullness (Primary)    2. Neck fullness  -     [place] Thyroid; Future    3. Pharyngitis, unspecified etiology  -     azithromycin (Zithromax Z-Donta) 250 MG tablet; Take 2 tablets by mouth on day 1, then 1 tablet daily on " days 2-5  Dispense: 6 tablet; Refill: 0              Follow Up     Return for Next scheduled follow up.    Patient was given instructions and counseling regarding her condition or for health maintenance advice. Please see specific information pulled into the AVS if appropriate.

## 2024-05-23 ENCOUNTER — PATIENT MESSAGE (OUTPATIENT)
Dept: FAMILY MEDICINE CLINIC | Facility: CLINIC | Age: 59
End: 2024-05-23
Payer: COMMERCIAL

## 2024-05-23 DIAGNOSIS — J02.9 PHARYNGITIS, UNSPECIFIED ETIOLOGY: Primary | ICD-10-CM

## 2024-05-23 RX ORDER — CEPHALEXIN 500 MG/1
500 CAPSULE ORAL 3 TIMES DAILY
Qty: 30 CAPSULE | Refills: 0 | Status: SHIPPED | OUTPATIENT
Start: 2024-05-23

## 2024-05-23 NOTE — TELEPHONE ENCOUNTER
From: Zahira Ramirez  To: Kelvin Khan  Sent: 5/23/2024 4:20 AM EDT  Subject: Zpac    The zpac doesn't seem to be working. I still have low grade fever and swollen lymphnodes/thick neck,drainage down my throat and full feeling ears. I leave for vacation Saturday morning so I am worried I will get worse by then.

## 2024-05-29 ENCOUNTER — PATIENT MESSAGE (OUTPATIENT)
Dept: FAMILY MEDICINE CLINIC | Facility: CLINIC | Age: 59
End: 2024-05-29
Payer: COMMERCIAL

## 2024-05-29 DIAGNOSIS — R19.06 EPIGASTRIC FULLNESS: Primary | ICD-10-CM

## 2024-05-29 NOTE — TELEPHONE ENCOUNTER
From: Zahira Ramirez  To: Kelvin Khan  Sent: 5/29/2024 7:58 AM EDT  Subject: Gastroenterologist referral     Kelvin- I've decided on Dr Noah Rain as the gastro doc I'd like to get a referral to see .  Thank you!

## 2024-06-06 ENCOUNTER — PATIENT MESSAGE (OUTPATIENT)
Dept: FAMILY MEDICINE CLINIC | Facility: CLINIC | Age: 59
End: 2024-06-06
Payer: COMMERCIAL

## 2024-06-06 DIAGNOSIS — J02.9 SORE THROAT: Primary | ICD-10-CM

## 2024-06-06 DIAGNOSIS — J02.9 PHARYNGITIS, UNSPECIFIED ETIOLOGY: ICD-10-CM

## 2024-06-06 NOTE — TELEPHONE ENCOUNTER
From: Zahira Ramirez  To: Kelvin Khan  Sent: 6/6/2024 1:36 PM EDT  Subject: Thyroid US follow up    I guess an ENT would be the next step. However....now that I am off the last of the antibiotics yesterday, my throat is worse and my fever is now 99.7 today.

## 2024-06-07 ENCOUNTER — LAB (OUTPATIENT)
Dept: LAB | Facility: HOSPITAL | Age: 59
End: 2024-06-07
Payer: COMMERCIAL

## 2024-06-07 ENCOUNTER — OFFICE VISIT (OUTPATIENT)
Dept: FAMILY MEDICINE CLINIC | Facility: CLINIC | Age: 59
End: 2024-06-07
Payer: COMMERCIAL

## 2024-06-07 VITALS
HEIGHT: 63 IN | TEMPERATURE: 98.6 F | BODY MASS INDEX: 42.05 KG/M2 | OXYGEN SATURATION: 100 % | HEART RATE: 81 BPM | SYSTOLIC BLOOD PRESSURE: 150 MMHG | WEIGHT: 237.3 LBS | DIASTOLIC BLOOD PRESSURE: 78 MMHG

## 2024-06-07 DIAGNOSIS — R30.0 DYSURIA: Primary | ICD-10-CM

## 2024-06-07 DIAGNOSIS — J02.9 SORE THROAT: ICD-10-CM

## 2024-06-07 DIAGNOSIS — J02.9 PHARYNGITIS, UNSPECIFIED ETIOLOGY: ICD-10-CM

## 2024-06-07 DIAGNOSIS — R31.9 HEMATURIA, UNSPECIFIED TYPE: ICD-10-CM

## 2024-06-07 LAB
BILIRUB BLD-MCNC: NEGATIVE MG/DL
CLARITY, POC: ABNORMAL
COLOR UR: YELLOW
EXPIRATION DATE: ABNORMAL
GLUCOSE UR STRIP-MCNC: NEGATIVE MG/DL
KETONES UR QL: NEGATIVE
LEUKOCYTE EST, POC: ABNORMAL
Lab: ABNORMAL
NITRITE UR-MCNC: NEGATIVE MG/ML
PH UR: 5.5 [PH] (ref 5–8)
PROT UR STRIP-MCNC: NEGATIVE MG/DL
RBC # UR STRIP: ABNORMAL /UL
SP GR UR: 1.02 (ref 1–1.03)
UROBILINOGEN UR QL: NORMAL

## 2024-06-07 PROCEDURE — 86644 CMV ANTIBODY: CPT

## 2024-06-07 PROCEDURE — 81003 URINALYSIS AUTO W/O SCOPE: CPT | Performed by: NURSE PRACTITIONER

## 2024-06-07 PROCEDURE — 86645 CMV ANTIBODY IGM: CPT

## 2024-06-07 PROCEDURE — 86665 EPSTEIN-BARR CAPSID VCA: CPT

## 2024-06-07 PROCEDURE — 86664 EPSTEIN-BARR NUCLEAR ANTIGEN: CPT

## 2024-06-07 PROCEDURE — 36415 COLL VENOUS BLD VENIPUNCTURE: CPT

## 2024-06-07 PROCEDURE — 86663 EPSTEIN-BARR ANTIBODY: CPT

## 2024-06-07 PROCEDURE — 87086 URINE CULTURE/COLONY COUNT: CPT | Performed by: NURSE PRACTITIONER

## 2024-06-07 PROCEDURE — 99213 OFFICE O/P EST LOW 20 MIN: CPT | Performed by: NURSE PRACTITIONER

## 2024-06-07 RX ORDER — OMEPRAZOLE 20 MG/1
20 CAPSULE, DELAYED RELEASE ORAL DAILY
COMMUNITY

## 2024-06-07 RX ORDER — NITROFURANTOIN 25; 75 MG/1; MG/1
100 CAPSULE ORAL 2 TIMES DAILY
Qty: 14 CAPSULE | Refills: 0 | Status: SHIPPED | OUTPATIENT
Start: 2024-06-07

## 2024-06-07 RX ORDER — FLUCONAZOLE 150 MG/1
150 TABLET ORAL DAILY
Qty: 2 TABLET | Refills: 0 | Status: SHIPPED | OUTPATIENT
Start: 2024-06-07 | End: 2024-06-08

## 2024-06-07 NOTE — PROGRESS NOTES
Chief Complaint  Back Pain and dysuria (Was running fever last night 99.7-99.8)    Subjective        Zahira Ramirez presents to [facility]  History of Present Illness  Back pain and low grade fever with urgency.  And back pain  with.      Started taking prilosec and some symptoms have started to help with neck.    Back Pain  Pertinent negatives include no chest pain, fever, numbness or weakness.       The following portions of the patient's history were personally reviewed and updated as appropriate: allergies, current medications, past medical history, past surgical history, past family history, and past social history.     Body mass index is 42.05 kg/m².           Past History:    Medical History: has a past medical history of Anemia, Broken bones, Diabetes mellitus, Endometriosis, Forgetfulness, Heart murmur ( this year), Hyperlipemia, Hypothyroidism, Obesity, Paroxysmal SVT/Atrial Tachycardia (10/16/2022), Pneumonia (Early July), and Stroke.     Surgical History: has a past surgical history that includes Laser ablation; Carpal tunnel release;  section; Abdominal hysterectomy; Other surgical history; Tubal ligation; and Endometrial ablation.     Family History: family history includes Aneurysm in her mother; Cervical cancer in her mother; Diabetes in her mother; Heart disease in her father and another family member; Hypertension in her father; Stroke in her paternal grandmother and another family member; Uterine cancer in an other family member.     Social History: reports that she quit smoking about 17 years ago. Her smoking use included cigarettes. She started smoking about 43 years ago. She has a 39.5 pack-year smoking history. She has been exposed to tobacco smoke. She has never used smokeless tobacco. She reports that she does not currently use alcohol after a past usage of about 1.0 standard drink of alcohol per week. She reports that she does not use  "drugs.    Allergies: Patient has no known allergies.          Current Outpatient Medications:     Accu-Chek Guide test strip, USE TO TEST ONCE DAILY, Disp: 100 each, Rfl: 1    metFORMIN (GLUCOPHAGE) 500 MG tablet, TAKE 1 TABLET BY MOUTH TWICE DAILY WITH MEALS, Disp: 180 tablet, Rfl: 1    omeprazole (priLOSEC) 20 MG capsule, Take 1 capsule by mouth Daily., Disp: , Rfl:     Thyroid (ARMOUR THYROID) 90 MG PO tablet, Take 1 tablet by mouth Daily., Disp: 90 tablet, Rfl: 0    vitamin D (ERGOCALCIFEROL) 1.25 MG (35597 UT) capsule capsule, Take 1 capsule by mouth Every 7 (Seven) Days., Disp: 13 capsule, Rfl: 1    fluconazole (DIFLUCAN) 150 MG tablet, Take 1 tablet by mouth Daily for 1 dose. Repeat in 4 days if needed., Disp: 2 tablet, Rfl: 0    nitrofurantoin, macrocrystal-monohydrate, (Macrobid) 100 MG capsule, Take 1 capsule by mouth 2 (Two) Times a Day., Disp: 14 capsule, Rfl: 0    Medications Discontinued During This Encounter   Medication Reason    azithromycin (Zithromax Z-Donta) 250 MG tablet *Therapy completed    cephalexin (Keflex) 500 MG capsule *Therapy completed         Review of Systems   Constitutional:  Negative for fever.   Respiratory:  Negative for cough and shortness of breath.    Cardiovascular:  Negative for chest pain, palpitations and leg swelling.   Musculoskeletal:  Positive for back pain.   Neurological:  Negative for dizziness, weakness, numbness and headache.        Objective         Vitals:    06/07/24 0943   BP: 150/78   BP Location: Left arm   Patient Position: Sitting   Cuff Size: Large Adult   Pulse: 81   Temp: 98.6 °F (37 °C)   SpO2: 100%   Weight: 108 kg (237 lb 4.8 oz)   Height: 160 cm (62.99\")     Body mass index is 42.05 kg/m².         Physical Exam  Vitals reviewed.   Constitutional:       Appearance: Normal appearance. She is well-developed.   HENT:      Head: Normocephalic and atraumatic.      Mouth/Throat:      Pharynx: No oropharyngeal exudate.   Eyes:      Conjunctiva/sclera: " Conjunctivae normal.      Pupils: Pupils are equal, round, and reactive to light.   Cardiovascular:      Rate and Rhythm: Normal rate and regular rhythm.      Heart sounds: Normal heart sounds. No murmur heard.     No friction rub. No gallop.   Pulmonary:      Effort: Pulmonary effort is normal.      Breath sounds: Normal breath sounds. No wheezing or rhonchi.   Skin:     General: Skin is warm and dry.   Neurological:      Mental Status: She is alert and oriented to person, place, and time.   Psychiatric:         Mood and Affect: Mood and affect normal.         Behavior: Behavior normal.         Thought Content: Thought content normal.         Judgment: Judgment normal.             Result Review :               Assessment and Plan     Diagnoses and all orders for this visit:    1. Dysuria (Primary)  -     POCT urinalysis dipstick, automated  -     Urine Culture - Urine, Urine, Clean Catch  -     nitrofurantoin, macrocrystal-monohydrate, (Macrobid) 100 MG capsule; Take 1 capsule by mouth 2 (Two) Times a Day.  Dispense: 14 capsule; Refill: 0  -     fluconazole (DIFLUCAN) 150 MG tablet; Take 1 tablet by mouth Daily for 1 dose. Repeat in 4 days if needed.  Dispense: 2 tablet; Refill: 0    2. Hematuria, unspecified type  -     Urine Culture - Urine, Urine, Clean Catch              Follow Up     Return for Next scheduled follow up.    Patient was given instructions and counseling regarding her condition or for health maintenance advice. Please see specific information pulled into the AVS if appropriate.

## 2024-06-08 LAB — BACTERIA SPEC AEROBE CULT: NORMAL

## 2024-06-09 LAB
CMV IGG SERPL IA-ACNC: <0.6 U/ML (ref 0–0.59)
CMV IGM SERPL IA-ACNC: <30 AU/ML (ref 0–29.9)

## 2024-06-10 LAB
EBV EA-D IGG SER-ACNC: <9 U/ML (ref 0–8.9)
EBV NA IGG SER IA-ACNC: <18 U/ML (ref 0–17.9)
EBV VCA IGM SER IA-ACNC: <36 U/ML (ref 0–35.9)

## 2024-06-14 ENCOUNTER — PATIENT MESSAGE (OUTPATIENT)
Dept: FAMILY MEDICINE CLINIC | Facility: CLINIC | Age: 59
End: 2024-06-14
Payer: COMMERCIAL

## 2024-06-14 NOTE — TELEPHONE ENCOUNTER
From: Zahira Ramirez  To: Kelvin Khan  Sent: 6/14/2024 4:48 AM EDT  Subject: Prilosec OTC    How long can I safely use Prilosec without causing other problems?

## 2024-08-13 DIAGNOSIS — E03.9 ACQUIRED HYPOTHYROIDISM: ICD-10-CM

## 2024-08-13 RX ORDER — THYROID 90 MG/1
90 TABLET ORAL DAILY
Qty: 30 TABLET | Refills: 0 | Status: SHIPPED | OUTPATIENT
Start: 2024-08-13